# Patient Record
Sex: MALE | Race: OTHER | Employment: FULL TIME | URBAN - METROPOLITAN AREA
[De-identification: names, ages, dates, MRNs, and addresses within clinical notes are randomized per-mention and may not be internally consistent; named-entity substitution may affect disease eponyms.]

---

## 2017-04-27 ENCOUNTER — OFFICE VISIT (OUTPATIENT)
Dept: FAMILY MEDICINE CLINIC | Facility: CLINIC | Age: 37
End: 2017-04-27

## 2017-04-27 VITALS
TEMPERATURE: 98 F | BODY MASS INDEX: 32 KG/M2 | DIASTOLIC BLOOD PRESSURE: 80 MMHG | HEART RATE: 74 BPM | SYSTOLIC BLOOD PRESSURE: 131 MMHG | RESPIRATION RATE: 16 BRPM | WEIGHT: 248 LBS

## 2017-04-27 DIAGNOSIS — R35.0 URINARY FREQUENCY: Primary | ICD-10-CM

## 2017-04-27 PROCEDURE — 99213 OFFICE O/P EST LOW 20 MIN: CPT | Performed by: FAMILY MEDICINE

## 2017-04-27 PROCEDURE — 99212 OFFICE O/P EST SF 10 MIN: CPT | Performed by: FAMILY MEDICINE

## 2017-04-27 PROCEDURE — 81003 URINALYSIS AUTO W/O SCOPE: CPT | Performed by: FAMILY MEDICINE

## 2017-04-27 NOTE — PROGRESS NOTES
HPI:  Teri Case is a 39year old male who presents for abdominal pain since Monday. Started when he was driving home. Pain resolved and then came back much worse last night. He states it also felt like he needed to urinate.   Had trouble sleeping secondary to hepatosplenomegaly      Assessment:/Plan:  Urinary frequency  (primary encounter diagnosis)    UA normal. Will send urine culture. Symptoms have improved some.   Monitor pain and call if worsens    Janeal Pleva, DO

## 2017-05-04 ENCOUNTER — TELEPHONE (OUTPATIENT)
Dept: FAMILY MEDICINE CLINIC | Facility: CLINIC | Age: 37
End: 2017-05-04

## 2017-05-04 NOTE — TELEPHONE ENCOUNTER
----- Message from Ofelia Ham MD sent at 5/3/2017  1:49 PM CDT -----  If symptoms have not resolved by end of week, make follow-up appt and we can decide what to do next.   Thanks- Dr. Vladimir Nelson  ----- Message -----     From: Christal Murray     Sent:

## 2017-05-31 ENCOUNTER — OFFICE VISIT (OUTPATIENT)
Dept: FAMILY MEDICINE CLINIC | Facility: CLINIC | Age: 37
End: 2017-05-31

## 2017-05-31 VITALS
TEMPERATURE: 98 F | BODY MASS INDEX: 31 KG/M2 | WEIGHT: 244 LBS | RESPIRATION RATE: 16 BRPM | DIASTOLIC BLOOD PRESSURE: 79 MMHG | SYSTOLIC BLOOD PRESSURE: 120 MMHG | HEART RATE: 64 BPM

## 2017-05-31 DIAGNOSIS — R30.0 DYSURIA: Primary | ICD-10-CM

## 2017-05-31 PROCEDURE — 99213 OFFICE O/P EST LOW 20 MIN: CPT | Performed by: FAMILY MEDICINE

## 2017-05-31 PROCEDURE — 81003 URINALYSIS AUTO W/O SCOPE: CPT | Performed by: FAMILY MEDICINE

## 2017-05-31 NOTE — PROGRESS NOTES
HPI: Janette Cruz is a 39year old male who presents for UTI symptoms. Last seen on 4/27 for similar symptoms. Went away few days after and then started few days ago. Pt reports that he is still having LLQ pain, urinary frequency, and dysuria.  Denies penile dis DO

## 2017-06-21 ENCOUNTER — OFFICE VISIT (OUTPATIENT)
Dept: SURGERY | Facility: CLINIC | Age: 37
End: 2017-06-21

## 2017-06-21 VITALS
DIASTOLIC BLOOD PRESSURE: 80 MMHG | HEART RATE: 65 BPM | SYSTOLIC BLOOD PRESSURE: 118 MMHG | HEIGHT: 74 IN | TEMPERATURE: 98 F | WEIGHT: 244 LBS | BODY MASS INDEX: 31.32 KG/M2

## 2017-06-21 DIAGNOSIS — R10.9 LEFT LATERAL ABDOMINAL PAIN: Primary | ICD-10-CM

## 2017-06-21 PROCEDURE — 99244 OFF/OP CNSLTJ NEW/EST MOD 40: CPT | Performed by: UROLOGY

## 2017-06-21 PROCEDURE — 81002 URINALYSIS NONAUTO W/O SCOPE: CPT | Performed by: UROLOGY

## 2017-06-21 PROCEDURE — 99213 OFFICE O/P EST LOW 20 MIN: CPT | Performed by: UROLOGY

## 2017-06-21 NOTE — PROGRESS NOTES
SUBJECTIVE:  Nasrin Dooley is a 39year old male who presents for a consultation at the request of, and a copy of this note will be sent to, Dr. Chino Horan, for evaluation of  Left lower quadrant abdominal pain and urgency, dysuria.   This first started 2 drinks or equivalent per week       Comment: Socially           REVIEW OF SYSTEMS:  RESPIRATORY:  Negative for chest tightness, wheezing, cough, shortness of breath,  sputum production,chest pain or pleurisy.   CARDIOVASCULAR:  Negative for pain or chest di encounter    Imaging & Referrals:  None

## 2017-06-27 ENCOUNTER — HOSPITAL ENCOUNTER (OUTPATIENT)
Dept: CT IMAGING | Facility: HOSPITAL | Age: 37
Discharge: HOME | End: 2017-06-27
Attending: UROLOGY
Payer: COMMERCIAL

## 2017-06-27 DIAGNOSIS — R10.9 LEFT LATERAL ABDOMINAL PAIN: ICD-10-CM

## 2017-06-27 LAB — CREAT BLD-MCNC: 1 MG/DL (ref 0.5–1.5)

## 2017-06-27 PROCEDURE — 82565 ASSAY OF CREATININE: CPT

## 2017-06-27 PROCEDURE — 74178 CT ABD&PLV WO CNTR FLWD CNTR: CPT | Performed by: UROLOGY

## 2017-07-14 ENCOUNTER — TELEPHONE (OUTPATIENT)
Dept: SURGERY | Facility: CLINIC | Age: 37
End: 2017-07-14

## 2017-07-14 NOTE — TELEPHONE ENCOUNTER
----- Message from Taya Lew MD sent at 7/10/2017  9:56 PM CDT -----  Call pt. Inform him CT scan shows only a 2 mm right nonobstructing kidney stone, gallstones that he should discuss with his PCP. No other findings are noted.   He should followup p

## 2017-07-21 ENCOUNTER — TELEPHONE (OUTPATIENT)
Dept: FAMILY MEDICINE CLINIC | Facility: CLINIC | Age: 37
End: 2017-07-21

## 2017-07-21 DIAGNOSIS — K80.20 CALCULUS OF GALLBLADDER WITHOUT CHOLECYSTITIS WITHOUT OBSTRUCTION: Primary | ICD-10-CM

## 2017-07-21 NOTE — TELEPHONE ENCOUNTER
Reviewed doctor's recommendations with pt, pt agreed with advice given. Per pt request referral information left on his VM.

## 2017-07-21 NOTE — TELEPHONE ENCOUNTER
Pt states he had CT scan done as ordered by urologist. Pt states he was advised to contact his PCP in follow up for gallstones and kidney stones as seen on CT.  Pt asking for further recommendations, states in the past he has also seen Dr. Ana Barba for symp

## 2017-08-23 ENCOUNTER — APPOINTMENT (OUTPATIENT)
Dept: LAB | Facility: HOSPITAL | Age: 37
End: 2017-08-23
Attending: PHYSICIAN ASSISTANT
Payer: COMMERCIAL

## 2017-08-23 ENCOUNTER — OFFICE VISIT (OUTPATIENT)
Dept: GASTROENTEROLOGY | Facility: CLINIC | Age: 37
End: 2017-08-23

## 2017-08-23 VITALS
DIASTOLIC BLOOD PRESSURE: 82 MMHG | TEMPERATURE: 98 F | WEIGHT: 240.81 LBS | HEART RATE: 88 BPM | HEIGHT: 74 IN | BODY MASS INDEX: 30.9 KG/M2 | OXYGEN SATURATION: 96 % | SYSTOLIC BLOOD PRESSURE: 120 MMHG

## 2017-08-23 DIAGNOSIS — R12 HEARTBURN: ICD-10-CM

## 2017-08-23 DIAGNOSIS — R10.32 LEFT LOWER QUADRANT PAIN: ICD-10-CM

## 2017-08-23 DIAGNOSIS — R93.5 ABNORMAL CT OF THE ABDOMEN: Primary | ICD-10-CM

## 2017-08-23 LAB
ALBUMIN SERPL BCP-MCNC: 4.4 G/DL (ref 3.5–4.8)
ALBUMIN/GLOB SERPL: 1.5 {RATIO} (ref 1–2)
ALP SERPL-CCNC: 68 U/L (ref 32–100)
ALT SERPL-CCNC: 47 U/L (ref 17–63)
ANION GAP SERPL CALC-SCNC: 9 MMOL/L (ref 0–18)
AST SERPL-CCNC: 30 U/L (ref 15–41)
BILIRUB SERPL-MCNC: 1.1 MG/DL (ref 0.3–1.2)
BUN SERPL-MCNC: 14 MG/DL (ref 8–20)
BUN/CREAT SERPL: 13.9 (ref 10–20)
CALCIUM SERPL-MCNC: 9.6 MG/DL (ref 8.5–10.5)
CHLORIDE SERPL-SCNC: 106 MMOL/L (ref 95–110)
CO2 SERPL-SCNC: 22 MMOL/L (ref 22–32)
CREAT SERPL-MCNC: 1.01 MG/DL (ref 0.5–1.5)
GLOBULIN PLAS-MCNC: 3 G/DL (ref 2.5–3.7)
GLUCOSE SERPL-MCNC: 91 MG/DL (ref 70–99)
LIPASE SERPL-CCNC: 25 U/L (ref 22–51)
OSMOLALITY UR CALC.SUM OF ELEC: 284 MOSM/KG (ref 275–295)
POTASSIUM SERPL-SCNC: 4 MMOL/L (ref 3.3–5.1)
PROT SERPL-MCNC: 7.4 G/DL (ref 5.9–8.4)
SODIUM SERPL-SCNC: 137 MMOL/L (ref 136–144)

## 2017-08-23 PROCEDURE — 36415 COLL VENOUS BLD VENIPUNCTURE: CPT

## 2017-08-23 PROCEDURE — 80053 COMPREHEN METABOLIC PANEL: CPT

## 2017-08-23 PROCEDURE — 83690 ASSAY OF LIPASE: CPT

## 2017-08-23 PROCEDURE — 99243 OFF/OP CNSLTJ NEW/EST LOW 30: CPT | Performed by: PHYSICIAN ASSISTANT

## 2017-08-23 PROCEDURE — 99212 OFFICE O/P EST SF 10 MIN: CPT | Performed by: PHYSICIAN ASSISTANT

## 2017-08-23 NOTE — H&P
[address] [facility] [address] Gastroenterology                                                                                                  Clinic History and Physical     Pa gastric cancers  - Negative for liver disease/cancer  - Negative for known IBD    Social Hx:  - Former Smoker - Quit 10 years ago  - Social ETOH - a few beers each weekend   - No NSAIDs/no ASA  - Occupation: Instructor (Automotive)   - Lives with wife at h hematuria +resolved dysuria   INTEGUMENT/BREAST:  SKIN:  negative for jaundice   ALLERGIC/IMMUNOLOGIC:  negative for hay fever  ENDOCRINE:  negative for cold intolerance and heat intolerance  MUSCULOSKELETAL:  negative for joint effusion/severe erythema  B referral for elective cholecystectomy with Dr. Brooks Rodriguez or Dr. Frieda Read. Will check for transaminitis to rule out liver disease. LLQ pain unexplained on CT - possibly referred from ? GB.     Heartburn likely secondary to classic triggers - pizza, eating late, e

## 2017-08-23 NOTE — PROGRESS NOTES
[address] [facility] [address] Gastroenterology                                                                                                  Clinic History and Physical     Pa gastric cancers  - Negative for liver disease/cancer  - Negative for known IBD    Social Hx:  - Former Smoker - Quit 10 years ago  - Social ETOH - a few beers each weekend   - No NSAIDs/no ASA  - Occupation: Instructor (Automotive)   - Lives with wife at h hematuria +resolved dysuria   INTEGUMENT/BREAST:  SKIN:  negative for jaundice   ALLERGIC/IMMUNOLOGIC:  negative for hay fever  ENDOCRINE:  negative for cold intolerance and heat intolerance  MUSCULOSKELETAL:  negative for joint effusion/severe erythema  B referral for elective cholecystectomy with Dr. Johana Denton or Dr. Johnson Bright. Will check for transaminitis to rule out liver disease. LLQ pain unexplained on CT - possibly referred from ? GB.     Heartburn likely secondary to classic triggers - pizza, eating late, e

## 2017-08-23 NOTE — PATIENT INSTRUCTIONS
Avoid Heartburn Triggers  - Laying down after meals (sit upright for at least 2-3 hours after eating meals)  - Large meals (serving sizes proportional to palm of hand)  - Spicy/greasy/acidic foods (fast food, orange/lime/lemon/red sauces)  - Excessive caff

## 2017-08-24 ENCOUNTER — TELEPHONE (OUTPATIENT)
Dept: GASTROENTEROLOGY | Facility: CLINIC | Age: 37
End: 2017-08-24

## 2017-08-24 DIAGNOSIS — K80.50 BILIARY COLIC: Primary | ICD-10-CM

## 2017-08-24 DIAGNOSIS — K80.20 CALCULUS OF GALLBLADDER WITHOUT CHOLECYSTITIS WITHOUT OBSTRUCTION: ICD-10-CM

## 2017-08-24 NOTE — TELEPHONE ENCOUNTER
Contacted patient offered appointment on 08/29/2017 with Dr. Johana Denton as patient is established with him, patient declined and states he requests to wait until he follows up with PB in 4-6 weeks. He states he will call back to schedule appointment.

## 2017-08-24 NOTE — TELEPHONE ENCOUNTER
Noted, thank you. Routed to Gen Surg RN's- please assist pt in scheduling consultation with provider per PB's message below, thank you.

## 2017-08-24 NOTE — TELEPHONE ENCOUNTER
I discussed with Dr. Rosa Richter re: this patient and the patient was well aware we would likely generate a referral to see either of our surgical MDs Jairo Ontiveros or Juliette) for possible consideration of cholecystectomy. Consultation referral ordered and signed.

## 2017-08-25 NOTE — TELEPHONE ENCOUNTER
Called patient back at number provided. LVM. No response. Please have patient provide a number and time I can call him Monday. Thank you. Note: I did advise the patient he is OK to schedule a surgical consult.

## 2017-08-25 NOTE — TELEPHONE ENCOUNTER
Pt contacted and I reviewed that PB advises for him to call and schedule the surgical consult, he verbalized understanding. He states that he prefers to be contacted M-F after 4pm if PB would like to discuss further recommendations.

## 2017-08-31 NOTE — TELEPHONE ENCOUNTER
I was able to finally reach the patient yesterday regarding surgical referral. All questions answered. Has follow up with me and also consult with Dr. Surinder Liu. No further questions - patient feels otherwise well.  Intermittent pain that is c/w history discus

## 2017-09-18 ENCOUNTER — OFFICE VISIT (OUTPATIENT)
Dept: SURGERY | Facility: CLINIC | Age: 37
End: 2017-09-18

## 2017-09-18 VITALS — WEIGHT: 244 LBS | HEIGHT: 74 IN | BODY MASS INDEX: 31.32 KG/M2

## 2017-09-18 DIAGNOSIS — R10.32 LLQ ABDOMINAL PAIN: Primary | ICD-10-CM

## 2017-09-18 DIAGNOSIS — K80.20 GALLSTONES: ICD-10-CM

## 2017-09-18 PROCEDURE — 99212 OFFICE O/P EST SF 10 MIN: CPT | Performed by: SURGERY

## 2017-09-18 PROCEDURE — 99214 OFFICE O/P EST MOD 30 MIN: CPT | Performed by: SURGERY

## 2017-09-18 NOTE — H&P
History and Physical      Sumanth Band is a 39year old male. HPI   Patient presents with:  Gallbladder: Pt. referred by Dr. Maninder Mascorro for gallstones.   Pt. with history of left lower abdominal pain for one year and pt states pain has moved higher up in 0.0 oz/week       Comment: Socially    Drug use: No            Other Topics            Concern  Caffeine Concern        Yes    Comment:Coffee, 3 cups daily        FAMILY HISTORY  Family History   Problem Relation Age of Onset   • Arthritis Father    • fi persist and no other etiology identified.          9/18/2017  Merline Rudd, MD

## 2017-09-27 NOTE — MR AVS SNAPSHOT
[facility]  [address], [address]  [phone]               Thank you for choosing us for your health care visit with Florencio Gupta MD.  We are glad to serve you and happy to provide you with this summary * Notice: This list has 2 medication(s) that are the same as other medications prescribed for you. Read the directions carefully, and ask your doctor or other care provider to review them with you.             Results of Recent Testing     URINALYSIS, AUT Fully enjoy your food when eating. Don’t eat while distracted and slow down. Avoid over sized portions. Don’t eat while when you’re bored.      EAT THESE FOODS MORE OFTEN: EAT THESE FOODS LESS OFTEN:   Make half your plate fruits and vegetables Highly - - -

## 2017-10-03 NOTE — PROGRESS NOTES
[address] [facility] Follow-up Visit    Priscilla OV:  - Dull ache in the LLQ - greasy/fatty foods makes it worse. Has been avoiding these foods \"to the point where I feel good\". Reason for his visit are what are his options. LLQ stays there. No fevers/chills.  No change in bowel habits, no hematochezia • fibromylagia [OTHER] Father    • Cancer Mother      Cancer - lung   • Diabetes Paternal Aunt    • Cancer Paternal Aunt      Cancer - lymphoma   • Diabetes Paternal Grandmother       Social History: Smoking status: Former Smoker noted, no masses are palpated -  NO LLQ pain presently  Back: No CVA tenderness   Skin: dry, warm, no jaundice  Ext: no LE edema is evident.    Neuro: Alert and interactive; patient is having movements of all 4 extremities     Nursing notes and vitals revie loss; patient states he is having difficulty with losing weight by himself but would like to try another time before going to see weight loss specialist     Recommend:  - Avoid heartburn triggers  - Avoid greasy/fatty foods  - Colonoscopy with MAC sedation

## 2017-10-04 ENCOUNTER — APPOINTMENT (OUTPATIENT)
Dept: LAB | Facility: HOSPITAL | Age: 37
End: 2017-10-04
Attending: PHYSICIAN ASSISTANT
Payer: COMMERCIAL

## 2017-10-04 ENCOUNTER — OFFICE VISIT (OUTPATIENT)
Dept: GASTROENTEROLOGY | Facility: CLINIC | Age: 37
End: 2017-10-04

## 2017-10-04 ENCOUNTER — TELEPHONE (OUTPATIENT)
Dept: GASTROENTEROLOGY | Facility: CLINIC | Age: 37
End: 2017-10-04

## 2017-10-04 VITALS
HEIGHT: 74 IN | HEART RATE: 82 BPM | SYSTOLIC BLOOD PRESSURE: 113 MMHG | WEIGHT: 243.38 LBS | BODY MASS INDEX: 31.24 KG/M2 | DIASTOLIC BLOOD PRESSURE: 80 MMHG

## 2017-10-04 DIAGNOSIS — R19.5 LOOSE STOOLS: Primary | ICD-10-CM

## 2017-10-04 DIAGNOSIS — R19.5 LOOSE STOOLS: ICD-10-CM

## 2017-10-04 DIAGNOSIS — R10.32 LLQ PAIN: Primary | ICD-10-CM

## 2017-10-04 DIAGNOSIS — R19.4 CHANGE IN BOWEL HABITS: ICD-10-CM

## 2017-10-04 PROCEDURE — 99214 OFFICE O/P EST MOD 30 MIN: CPT | Performed by: PHYSICIAN ASSISTANT

## 2017-10-04 PROCEDURE — 83516 IMMUNOASSAY NONANTIBODY: CPT

## 2017-10-04 PROCEDURE — 86255 FLUORESCENT ANTIBODY SCREEN: CPT

## 2017-10-04 PROCEDURE — 36415 COLL VENOUS BLD VENIPUNCTURE: CPT

## 2017-10-04 PROCEDURE — 82784 ASSAY IGA/IGD/IGG/IGM EACH: CPT

## 2017-10-04 PROCEDURE — 99212 OFFICE O/P EST SF 10 MIN: CPT | Performed by: PHYSICIAN ASSISTANT

## 2017-10-04 NOTE — TELEPHONE ENCOUNTER
Scheduled for:  Colon  Provider Name: Justine Lauren  Date:  11-29-17  Location:  [facility]  Sedation:  MAC  Time:  11:15am, arrival 10:15am  Prep: Suprep  Meds/Allergies Reconciled?:  yes  Diagnosis with codes:  Loose stools R19.5, change in bowel habits R19.4  Was patien

## 2017-10-04 NOTE — PATIENT INSTRUCTIONS
1. Schedule colonoscopy with Dr. Hortencia Pham with MAC sedation. Schedule for about a month out. 2.  bowel prep from pharmacy - I have prescribed Suprep. This is a smaller volume preparation.  If this is too expensive, however, please call my office and

## 2017-10-05 ENCOUNTER — TELEPHONE (OUTPATIENT)
Dept: GASTROENTEROLOGY | Facility: CLINIC | Age: 37
End: 2017-10-05

## 2017-10-05 DIAGNOSIS — R19.5 LOOSE STOOLS: Primary | ICD-10-CM

## 2017-10-11 ENCOUNTER — TELEPHONE (OUTPATIENT)
Dept: GASTROENTEROLOGY | Facility: CLINIC | Age: 37
End: 2017-10-11

## 2017-10-11 NOTE — TELEPHONE ENCOUNTER
Negative celiac testing. Patient states when he avoids his triggers, he's doing a lot better. Junk food gives him loose stools/abdominal discomfort. Will call if anything changes. No further questions.

## 2017-10-18 NOTE — TELEPHONE ENCOUNTER
Noted, thank you Juanis Lund. Pt presents to ED c/o lower back pain and left leg pain for 1 month. Pt denies bladder/bowel dysfunction.

## 2017-10-31 ENCOUNTER — TELEPHONE (OUTPATIENT)
Dept: FAMILY MEDICINE CLINIC | Facility: CLINIC | Age: 37
End: 2017-10-31

## 2017-10-31 NOTE — TELEPHONE ENCOUNTER
Spouse contacted, and advised that pt had his Tdap in 2014, and doesn't need another one until 2024. Nurse visit scheduled for a flu shot for pt and his son on 11/7/17.

## 2017-11-07 ENCOUNTER — TELEPHONE (OUTPATIENT)
Dept: GASTROENTEROLOGY | Facility: CLINIC | Age: 37
End: 2017-11-07

## 2017-11-07 ENCOUNTER — IMMUNIZATION (OUTPATIENT)
Dept: FAMILY MEDICINE CLINIC | Facility: CLINIC | Age: 37
End: 2017-11-07

## 2017-11-07 DIAGNOSIS — Z23 NEED FOR VACCINATION: ICD-10-CM

## 2017-11-07 PROCEDURE — 90471 IMMUNIZATION ADMIN: CPT | Performed by: FAMILY MEDICINE

## 2017-11-07 PROCEDURE — 90686 IIV4 VACC NO PRSV 0.5 ML IM: CPT | Performed by: FAMILY MEDICINE

## 2017-11-21 ENCOUNTER — TELEPHONE (OUTPATIENT)
Dept: GASTROENTEROLOGY | Facility: CLINIC | Age: 37
End: 2017-11-21

## 2017-11-21 NOTE — TELEPHONE ENCOUNTER
Pt requesting to speak with Dr or RN indicates he has new symptoms nausea after eating. Pls call pt DX:[phone],HALIMA.   *Pt has upcoming cln ruth 11/29

## 2017-11-21 NOTE — TELEPHONE ENCOUNTER
LMTCB- reviewed that if having severe abdominal pain, uncontrollable n/v, fever, chills, he should present to the ED for further evaluation.    -Awaiting pt call back.

## 2017-11-29 ENCOUNTER — ANESTHESIA EVENT (OUTPATIENT)
Dept: ENDOSCOPY | Facility: HOSPITAL | Age: 37
End: 2017-11-29
Payer: COMMERCIAL

## 2017-11-29 ENCOUNTER — TELEPHONE (OUTPATIENT)
Dept: GASTROENTEROLOGY | Facility: CLINIC | Age: 37
End: 2017-11-29

## 2017-11-29 ENCOUNTER — HOSPITAL ENCOUNTER (OUTPATIENT)
Facility: HOSPITAL | Age: 37
Setting detail: HOSPITAL OUTPATIENT SURGERY
Discharge: HOME | End: 2017-11-29
Attending: INTERNAL MEDICINE | Admitting: INTERNAL MEDICINE
Payer: COMMERCIAL

## 2017-11-29 ENCOUNTER — SURGERY (OUTPATIENT)
Age: 37
End: 2017-11-29

## 2017-11-29 ENCOUNTER — ANESTHESIA (OUTPATIENT)
Dept: ENDOSCOPY | Facility: HOSPITAL | Age: 37
End: 2017-11-29
Payer: COMMERCIAL

## 2017-11-29 DIAGNOSIS — R19.4 CHANGE IN BOWEL HABITS: ICD-10-CM

## 2017-11-29 DIAGNOSIS — K64.8 INTERNAL HEMORRHOIDS: ICD-10-CM

## 2017-11-29 DIAGNOSIS — K38.9 APPENDIX DISEASE: Primary | ICD-10-CM

## 2017-11-29 DIAGNOSIS — K63.5 POLYP OF ASCENDING COLON, UNSPECIFIED TYPE: Primary | ICD-10-CM

## 2017-11-29 DIAGNOSIS — R19.5 LOOSE STOOLS: ICD-10-CM

## 2017-11-29 PROCEDURE — 45380 COLONOSCOPY AND BIOPSY: CPT | Performed by: INTERNAL MEDICINE

## 2017-11-29 PROCEDURE — 0DBE8ZX EXCISION OF LARGE INTESTINE, VIA NATURAL OR ARTIFICIAL OPENING ENDOSCOPIC, DIAGNOSTIC: ICD-10-PCS | Performed by: INTERNAL MEDICINE

## 2017-11-29 PROCEDURE — 45385 COLONOSCOPY W/LESION REMOVAL: CPT | Performed by: INTERNAL MEDICINE

## 2017-11-29 PROCEDURE — 0DBJ8ZX EXCISION OF APPENDIX, VIA NATURAL OR ARTIFICIAL OPENING ENDOSCOPIC, DIAGNOSTIC: ICD-10-PCS | Performed by: INTERNAL MEDICINE

## 2017-11-29 PROCEDURE — 0DBK8ZX EXCISION OF ASCENDING COLON, VIA NATURAL OR ARTIFICIAL OPENING ENDOSCOPIC, DIAGNOSTIC: ICD-10-PCS | Performed by: INTERNAL MEDICINE

## 2017-11-29 RX ORDER — SODIUM CHLORIDE, SODIUM LACTATE, POTASSIUM CHLORIDE, CALCIUM CHLORIDE 600; 310; 30; 20 MG/100ML; MG/100ML; MG/100ML; MG/100ML
INJECTION, SOLUTION INTRAVENOUS CONTINUOUS
Status: DISCONTINUED | OUTPATIENT
Start: 2017-11-29 | End: 2017-11-29

## 2017-11-29 RX ORDER — PANTOPRAZOLE SODIUM 40 MG/1
40 TABLET, DELAYED RELEASE ORAL
Qty: 30 TABLET | Refills: 3 | Status: SHIPPED | OUTPATIENT
Start: 2017-11-29 | End: 2017-12-29

## 2017-11-29 RX ORDER — NALOXONE HYDROCHLORIDE 0.4 MG/ML
80 INJECTION, SOLUTION INTRAMUSCULAR; INTRAVENOUS; SUBCUTANEOUS AS NEEDED
Status: DISCONTINUED | OUTPATIENT
Start: 2017-11-29 | End: 2017-11-29

## 2017-11-29 RX ORDER — HYDROMORPHONE HYDROCHLORIDE 1 MG/ML
0.4 INJECTION, SOLUTION INTRAMUSCULAR; INTRAVENOUS; SUBCUTANEOUS EVERY 5 MIN PRN
Status: DISCONTINUED | OUTPATIENT
Start: 2017-11-29 | End: 2017-11-29

## 2017-11-29 RX ORDER — HYDROCODONE BITARTRATE AND ACETAMINOPHEN 5; 325 MG/1; MG/1
1 TABLET ORAL AS NEEDED
Status: DISCONTINUED | OUTPATIENT
Start: 2017-11-29 | End: 2017-11-29

## 2017-11-29 RX ORDER — HALOPERIDOL 5 MG/ML
0.25 INJECTION INTRAMUSCULAR ONCE AS NEEDED
Status: DISCONTINUED | OUTPATIENT
Start: 2017-11-29 | End: 2017-11-29

## 2017-11-29 RX ORDER — MORPHINE SULFATE 10 MG/ML
6 INJECTION, SOLUTION INTRAMUSCULAR; INTRAVENOUS EVERY 10 MIN PRN
Status: DISCONTINUED | OUTPATIENT
Start: 2017-11-29 | End: 2017-11-29

## 2017-11-29 RX ORDER — LIDOCAINE HYDROCHLORIDE 10 MG/ML
INJECTION, SOLUTION EPIDURAL; INFILTRATION; INTRACAUDAL; PERINEURAL AS NEEDED
Status: DISCONTINUED | OUTPATIENT
Start: 2017-11-29 | End: 2017-11-29 | Stop reason: SURG

## 2017-11-29 RX ORDER — HYDROMORPHONE HYDROCHLORIDE 1 MG/ML
0.2 INJECTION, SOLUTION INTRAMUSCULAR; INTRAVENOUS; SUBCUTANEOUS EVERY 5 MIN PRN
Status: DISCONTINUED | OUTPATIENT
Start: 2017-11-29 | End: 2017-11-29

## 2017-11-29 RX ORDER — MORPHINE SULFATE 4 MG/ML
4 INJECTION, SOLUTION INTRAMUSCULAR; INTRAVENOUS EVERY 10 MIN PRN
Status: DISCONTINUED | OUTPATIENT
Start: 2017-11-29 | End: 2017-11-29

## 2017-11-29 RX ORDER — ONDANSETRON 2 MG/ML
4 INJECTION INTRAMUSCULAR; INTRAVENOUS ONCE AS NEEDED
Status: DISCONTINUED | OUTPATIENT
Start: 2017-11-29 | End: 2017-11-29

## 2017-11-29 RX ORDER — HYDROCODONE BITARTRATE AND ACETAMINOPHEN 5; 325 MG/1; MG/1
2 TABLET ORAL AS NEEDED
Status: DISCONTINUED | OUTPATIENT
Start: 2017-11-29 | End: 2017-11-29

## 2017-11-29 RX ORDER — MORPHINE SULFATE 4 MG/ML
2 INJECTION, SOLUTION INTRAMUSCULAR; INTRAVENOUS EVERY 10 MIN PRN
Status: DISCONTINUED | OUTPATIENT
Start: 2017-11-29 | End: 2017-11-29

## 2017-11-29 RX ORDER — HYDROMORPHONE HYDROCHLORIDE 1 MG/ML
0.6 INJECTION, SOLUTION INTRAMUSCULAR; INTRAVENOUS; SUBCUTANEOUS EVERY 5 MIN PRN
Status: DISCONTINUED | OUTPATIENT
Start: 2017-11-29 | End: 2017-11-29

## 2017-11-29 RX ADMIN — LIDOCAINE HYDROCHLORIDE 50 MG: 10 INJECTION, SOLUTION EPIDURAL; INFILTRATION; INTRACAUDAL; PERINEURAL at 11:14:00

## 2017-11-29 RX ADMIN — SODIUM CHLORIDE, SODIUM LACTATE, POTASSIUM CHLORIDE, CALCIUM CHLORIDE: 600; 310; 30; 20 INJECTION, SOLUTION INTRAVENOUS at 11:12:00

## 2017-11-29 NOTE — ANESTHESIA POSTPROCEDURE EVALUATION
Patient: Rod Livingston    Procedure Summary     Date:  11/29/17 Room / Location:  [address] [place] [address] ENDOSCOPY 01 / [address] [place] [address] ENDOSCOPY    Anesthesia Start:  0780 Anesthesia Stop:  8318    Procedure:  COLONOSCOPY (N/A ) Diagnosis:       Change in bowel habits      Loose stools

## 2017-11-29 NOTE — OPERATIVE REPORT
COLONOSCOPY REPORT    Stevan Perry     11/15/1980 Age 44year old   PCP Dionte Mendoza DO Endoscopist Lani Palafox MD     Date of procedure: 17    Procedure: Colonoscopy w/cold biopsy and cold snare polypectomy    Pre-operative diagnosis: small internal hemorrhoids. 5. At the cecal base, the appendiceal orifice is not present, and instead the appendix appears to be inverted. No mucus was noted. Biopsies were obtained of the inverted appendix.  Probing did not reveal any clear submucosal m

## 2017-11-29 NOTE — H&P
History & Physical Examination    Patient Name: Irwin Mills  MRN: [id number]  CSN: [id number]  YOB: 1980    Diagnosis: abdominal discomfort, change in bowels      Prescriptions Prior to Admission:  FIBER OR Take by mouth.  Disp:  Rfl:  Taking unspecified    • Kidney stone    • Lumbar disc herniation     L4-L5, tx by chiropractor   • Sinusitis      Past Surgical History:  1981: APPENDECTOMY      Comment: laparotomy  2001: CORRECT BUNION,SIMPLE Left  2009: [address]  2002: Flaca Petty

## 2017-11-29 NOTE — ANESTHESIA PREPROCEDURE EVALUATION
Anesthesia PreOp Note    HPI:     Kiki Mccullough is a 44year old male who presents for preoperative consultation requested by: Georgina Pantoja MD    Date of Surgery: 11/29/2017    Procedure(s):  COLONOSCOPY  Indication: Change in bowel habits   Loose stools Comment: Coffee, 3 cups daily     Social History Narrative   None on file       Available pre-op labs reviewed. Vital Signs: Body mass index is 28.25 kg/m². height is 1.88 m (6' 2\") and weight is 99.8 kg (220 lb).  His blood pressure is 125/

## 2017-11-30 VITALS
SYSTOLIC BLOOD PRESSURE: 120 MMHG | HEART RATE: 70 BPM | WEIGHT: 220 LBS | HEIGHT: 74 IN | OXYGEN SATURATION: 99 % | DIASTOLIC BLOOD PRESSURE: 93 MMHG | TEMPERATURE: 98 F | BODY MASS INDEX: 28.23 KG/M2 | RESPIRATION RATE: 16 BRPM

## 2017-12-01 ENCOUNTER — TELEPHONE (OUTPATIENT)
Dept: GASTROENTEROLOGY | Facility: CLINIC | Age: 37
End: 2017-12-01

## 2017-12-01 NOTE — TELEPHONE ENCOUNTER
D/w Lowella Dakin re: his CLn path. Tubular adenoma polyp removed. The inverted appendiceal lesion showed normal colonic mucosa on biopsies BUT UNDERLYING MALIGNANCY/TUMOR cannot be exclude. Lowella Dakin understands this.     I did d/w Dr. Sherice Levine who will see patient to as

## 2017-12-01 NOTE — TELEPHONE ENCOUNTER
FODMAP diet mailed to pt home address today. Entered into EPIC:Recall colon in 5 years per Dr. Eben Zamudio. Last Colon done 11/29/17, next due 11/29/2022. Snapshot updated.

## 2018-11-16 ENCOUNTER — IMMUNIZATION (OUTPATIENT)
Dept: FAMILY MEDICINE CLINIC | Facility: CLINIC | Age: 38
End: 2018-11-16
Payer: COMMERCIAL

## 2018-11-16 DIAGNOSIS — Z23 NEED FOR VACCINATION: ICD-10-CM

## 2018-11-16 PROCEDURE — 90686 IIV4 VACC NO PRSV 0.5 ML IM: CPT | Performed by: FAMILY MEDICINE

## 2018-11-16 PROCEDURE — 90471 IMMUNIZATION ADMIN: CPT | Performed by: FAMILY MEDICINE

## 2019-07-11 ENCOUNTER — APPOINTMENT (OUTPATIENT)
Dept: LAB | Age: 39
End: 2019-07-11
Attending: FAMILY MEDICINE
Payer: COMMERCIAL

## 2019-07-11 ENCOUNTER — OFFICE VISIT (OUTPATIENT)
Dept: FAMILY MEDICINE CLINIC | Facility: CLINIC | Age: 39
End: 2019-07-11
Payer: COMMERCIAL

## 2019-07-11 VITALS
WEIGHT: 244 LBS | DIASTOLIC BLOOD PRESSURE: 77 MMHG | HEART RATE: 63 BPM | BODY MASS INDEX: 31 KG/M2 | TEMPERATURE: 98 F | RESPIRATION RATE: 16 BRPM | SYSTOLIC BLOOD PRESSURE: 122 MMHG

## 2019-07-11 DIAGNOSIS — M79.675 GREAT TOE PAIN, LEFT: ICD-10-CM

## 2019-07-11 DIAGNOSIS — B35.1 TOENAIL FUNGUS: ICD-10-CM

## 2019-07-11 DIAGNOSIS — M79.675 GREAT TOE PAIN, LEFT: Primary | ICD-10-CM

## 2019-07-11 LAB — URATE SERPL-MCNC: 7.9 MG/DL (ref 3.5–7.2)

## 2019-07-11 PROCEDURE — 84550 ASSAY OF BLOOD/URIC ACID: CPT

## 2019-07-11 PROCEDURE — 36415 COLL VENOUS BLD VENIPUNCTURE: CPT

## 2019-07-11 PROCEDURE — 99214 OFFICE O/P EST MOD 30 MIN: CPT | Performed by: FAMILY MEDICINE

## 2019-07-11 RX ORDER — PREDNISONE 20 MG/1
20 TABLET ORAL 2 TIMES DAILY
Qty: 10 TABLET | Refills: 0 | Status: SHIPPED | OUTPATIENT
Start: 2019-07-11 | End: 2019-07-16

## 2019-07-11 NOTE — PROGRESS NOTES
HPI: Shelli Ferreira is a 43year old male who presents for left foot pain. Started having pain in left foot about 4 days ago. Had bunion surgery in the past and gets occasional pain in that area. Has 2 pins in place. Pain is located in 1st MTP.   Started wearing bet

## 2019-07-24 ENCOUNTER — PATIENT MESSAGE (OUTPATIENT)
Dept: FAMILY MEDICINE CLINIC | Facility: CLINIC | Age: 39
End: 2019-07-24

## 2019-07-24 DIAGNOSIS — M10.9 ACUTE GOUT, UNSPECIFIED CAUSE, UNSPECIFIED SITE: Primary | ICD-10-CM

## 2019-07-24 RX ORDER — PREDNISONE 20 MG/1
20 TABLET ORAL 2 TIMES DAILY
Qty: 10 TABLET | Refills: 0 | Status: SHIPPED | OUTPATIENT
Start: 2019-07-24 | End: 2019-07-29

## 2019-07-24 RX ORDER — FEBUXOSTAT 40 MG/1
40 TABLET, FILM COATED ORAL DAILY
Qty: 30 TABLET | Refills: 1 | Status: SHIPPED | OUTPATIENT
Start: 2019-07-24 | End: 2019-09-25

## 2019-07-24 NOTE — TELEPHONE ENCOUNTER
From: Lela Rodriguez  To: Keith Bobby DO  Sent: 7/24/2019 10:12 AM CDT  Subject: Test Results Question    Hi! Gout pain came back. Have not eaten any purine rich foods all week. Been eating a lot of celery and cherrys!   As much as I hate medicine, I

## 2019-07-24 NOTE — TELEPHONE ENCOUNTER
Spoke with patient who still has symptoms. Start Uloric at 40mg. Recheck uric acid in 2 weeks. If remains above 6, increase dose to 80.

## 2019-08-09 ENCOUNTER — HOSPITAL ENCOUNTER (OUTPATIENT)
Dept: GENERAL RADIOLOGY | Age: 39
Discharge: HOME | End: 2019-08-09
Attending: FAMILY MEDICINE
Payer: COMMERCIAL

## 2019-08-09 ENCOUNTER — OFFICE VISIT (OUTPATIENT)
Dept: FAMILY MEDICINE CLINIC | Facility: CLINIC | Age: 39
End: 2019-08-09
Payer: COMMERCIAL

## 2019-08-09 VITALS
HEIGHT: 74 IN | WEIGHT: 231.63 LBS | HEART RATE: 70 BPM | TEMPERATURE: 98 F | SYSTOLIC BLOOD PRESSURE: 130 MMHG | BODY MASS INDEX: 29.73 KG/M2 | DIASTOLIC BLOOD PRESSURE: 80 MMHG | OXYGEN SATURATION: 98 %

## 2019-08-09 DIAGNOSIS — L30.9 DERMATITIS: Primary | ICD-10-CM

## 2019-08-09 DIAGNOSIS — M79.675 GREAT TOE PAIN, LEFT: ICD-10-CM

## 2019-08-09 PROCEDURE — 73630 X-RAY EXAM OF FOOT: CPT | Performed by: FAMILY MEDICINE

## 2019-08-09 PROCEDURE — 99213 OFFICE O/P EST LOW 20 MIN: CPT | Performed by: FAMILY MEDICINE

## 2019-08-09 RX ORDER — TRIAMCINOLONE ACETONIDE 0.25 MG/G
1 CREAM TOPICAL 2 TIMES DAILY
Qty: 80 G | Refills: 0 | Status: SHIPPED | OUTPATIENT
Start: 2019-08-09 | End: 2019-09-25

## 2019-08-09 NOTE — PROGRESS NOTES
HPI:    Michael Enrique is a 43year old male presents to clinic with a one-week history of an itchy rash on both arms. Does not have a history of eczema or other skin issues. Feels itching is getting worse.   Does note being outdoors last week in [place] Allergies:    Amoxicillin             PAIN  Azithromycin            NAUSEA ONLY  Cat Dander [Dander]       Pollinex-T [Modifie*      Shellfish-Derived P*    SHORTNESS OF BREATH      ROS:   Review of Systems  See HPI   PHYSICAL EXAM:      08/09/19  10

## 2019-09-25 ENCOUNTER — OFFICE VISIT (OUTPATIENT)
Dept: FAMILY MEDICINE CLINIC | Facility: CLINIC | Age: 39
End: 2019-09-25
Payer: COMMERCIAL

## 2019-09-25 ENCOUNTER — APPOINTMENT (OUTPATIENT)
Dept: LAB | Age: 39
End: 2019-09-25
Attending: FAMILY MEDICINE
Payer: COMMERCIAL

## 2019-09-25 VITALS
WEIGHT: 231 LBS | HEART RATE: 73 BPM | TEMPERATURE: 98 F | DIASTOLIC BLOOD PRESSURE: 71 MMHG | BODY MASS INDEX: 29.65 KG/M2 | RESPIRATION RATE: 16 BRPM | SYSTOLIC BLOOD PRESSURE: 116 MMHG | HEIGHT: 73.82 IN

## 2019-09-25 DIAGNOSIS — Z00.00 ROUTINE PHYSICAL EXAMINATION: Primary | ICD-10-CM

## 2019-09-25 DIAGNOSIS — M10.9 ACUTE GOUT, UNSPECIFIED CAUSE, UNSPECIFIED SITE: ICD-10-CM

## 2019-09-25 DIAGNOSIS — M1A.9XX0 CHRONIC GOUT WITHOUT TOPHUS, UNSPECIFIED CAUSE, UNSPECIFIED SITE: ICD-10-CM

## 2019-09-25 LAB
ALBUMIN SERPL-MCNC: 4.2 G/DL (ref 3.4–5)
ALBUMIN/GLOB SERPL: 1.2 {RATIO} (ref 1–2)
ALP LIVER SERPL-CCNC: 71 U/L (ref 45–117)
ALT SERPL-CCNC: 77 U/L (ref 16–61)
ANION GAP SERPL CALC-SCNC: 6 MMOL/L (ref 0–18)
AST SERPL-CCNC: 35 U/L (ref 15–37)
BILIRUB SERPL-MCNC: 1.4 MG/DL (ref 0.1–2)
BUN BLD-MCNC: 22 MG/DL (ref 7–18)
BUN/CREAT SERPL: 22.9 (ref 10–20)
CALCIUM BLD-MCNC: 9 MG/DL (ref 8.5–10.1)
CHLORIDE SERPL-SCNC: 107 MMOL/L (ref 98–112)
CO2 SERPL-SCNC: 24 MMOL/L (ref 21–32)
CREAT BLD-MCNC: 0.96 MG/DL (ref 0.7–1.3)
GLOBULIN PLAS-MCNC: 3.5 G/DL (ref 2.8–4.4)
GLUCOSE BLD-MCNC: 87 MG/DL (ref 70–99)
M PROTEIN MFR SERPL ELPH: 7.7 G/DL (ref 6.4–8.2)
OSMOLALITY SERPL CALC.SUM OF ELEC: 287 MOSM/KG (ref 275–295)
PATIENT FASTING: NO
POTASSIUM SERPL-SCNC: 3.8 MMOL/L (ref 3.5–5.1)
SODIUM SERPL-SCNC: 137 MMOL/L (ref 136–145)
URATE SERPL-MCNC: 7.4 MG/DL (ref 3.5–7.2)

## 2019-09-25 PROCEDURE — 36415 COLL VENOUS BLD VENIPUNCTURE: CPT

## 2019-09-25 PROCEDURE — 84550 ASSAY OF BLOOD/URIC ACID: CPT

## 2019-09-25 PROCEDURE — 80053 COMPREHEN METABOLIC PANEL: CPT

## 2019-09-25 PROCEDURE — 99395 PREV VISIT EST AGE 18-39: CPT | Performed by: FAMILY MEDICINE

## 2019-09-25 NOTE — PROGRESS NOTES
HPI: Jr Shannon is a 43year old male who presents for routine physical. Son has been sick. Pt states he has runny nose and mild cough. Feeling well. Getting limited exercise. Hard with 2 kids. Lost 13 pounds after he stopped meat and beer. TdaP up to date. gout without tophus, unspecified cause, unspecified site    Pain improved with dietary changes. Will recheck uric acid levels.      Padma Fox DO

## 2019-09-30 ENCOUNTER — IMMUNIZATION (OUTPATIENT)
Dept: FAMILY MEDICINE CLINIC | Facility: CLINIC | Age: 39
End: 2019-09-30
Payer: COMMERCIAL

## 2019-09-30 DIAGNOSIS — Z23 NEED FOR VACCINATION: ICD-10-CM

## 2019-09-30 PROCEDURE — 90471 IMMUNIZATION ADMIN: CPT | Performed by: INTERNAL MEDICINE

## 2019-09-30 PROCEDURE — 90686 IIV4 VACC NO PRSV 0.5 ML IM: CPT | Performed by: INTERNAL MEDICINE

## 2020-02-05 ENCOUNTER — NURSE TRIAGE (OUTPATIENT)
Dept: FAMILY MEDICINE CLINIC | Facility: CLINIC | Age: 40
End: 2020-02-05

## 2020-02-05 NOTE — TELEPHONE ENCOUNTER
Patient has scheduled appointment for 2/20 please review comments.      Appointment For: Anca Cutler ([id number])   Visit Type: Sharri Adame ([id number])      2/20/2020    3:00 PM  15 mins. Josh Trejo DO     [facility]      Patient Comments:   Gino Bernal

## 2020-02-05 NOTE — TELEPHONE ENCOUNTER
Action Requested: Summary for Provider     []  Critical Lab, Recommendations Needed  [] Need Additional Advice  []   FYI    []   Need Orders  [] Need Medications Sent to Pharmacy  []  Other     SUMMARY:  Per protocol advised office visit.   Patient will iram

## 2020-02-20 ENCOUNTER — OFFICE VISIT (OUTPATIENT)
Dept: FAMILY MEDICINE CLINIC | Facility: CLINIC | Age: 40
End: 2020-02-20
Payer: COMMERCIAL

## 2020-02-20 VITALS
DIASTOLIC BLOOD PRESSURE: 90 MMHG | HEART RATE: 77 BPM | BODY MASS INDEX: 32 KG/M2 | SYSTOLIC BLOOD PRESSURE: 129 MMHG | TEMPERATURE: 97 F | RESPIRATION RATE: 16 BRPM | WEIGHT: 247 LBS

## 2020-02-20 DIAGNOSIS — J34.89 NASAL DRAINAGE: ICD-10-CM

## 2020-02-20 DIAGNOSIS — R10.32 LEFT GROIN PAIN: Primary | ICD-10-CM

## 2020-02-20 PROCEDURE — 99213 OFFICE O/P EST LOW 20 MIN: CPT | Performed by: FAMILY MEDICINE

## 2020-02-20 NOTE — PROGRESS NOTES
HPI: Karsten Albert is a 35year old male who presents for LLQ pain. Complains of intermittent left groin pain. Radiates into leg at times. Never noticed bulge. Has sensation under left ribcage after ejaculation. Happens every time, only after intercourse.  Norm

## 2020-03-04 ENCOUNTER — OFFICE VISIT (OUTPATIENT)
Dept: SURGERY | Facility: CLINIC | Age: 40
End: 2020-03-04
Payer: COMMERCIAL

## 2020-03-04 VITALS — HEIGHT: 74 IN | BODY MASS INDEX: 31.57 KG/M2 | WEIGHT: 246 LBS

## 2020-03-04 DIAGNOSIS — K40.90 LEFT INGUINAL HERNIA: Primary | ICD-10-CM

## 2020-03-04 PROCEDURE — 99213 OFFICE O/P EST LOW 20 MIN: CPT | Performed by: SURGERY

## 2020-03-04 NOTE — PATIENT INSTRUCTIONS
Obtain preoperative testing. Plan laparoscopic repair of left inguinal hernia possible right inguinal hernia with mesh    Same day surgery to call the day before with instructions.

## 2020-03-04 NOTE — H&P
History and Physical      HPI   Patient presents with:  Referral: Referral from Dr. Fang Winter for possible Left Inguinal Hernia. Onset 2- 3 mos.   BM's and urnie are normal.         HPI  Jose Zheng is a 35year old male who presents with a left inguinal her Problem Relation Age of Onset   • Arthritis Father    • Other (fibromylagia) Father    • Cancer Mother         Cancer - lung   • Diabetes Paternal Aunt    • Cancer Paternal Aunt         Cancer - lymphoma   • Diabetes Paternal Grandmother        Review of

## 2020-05-08 ENCOUNTER — NURSE TRIAGE (OUTPATIENT)
Dept: FAMILY MEDICINE CLINIC | Facility: CLINIC | Age: 40
End: 2020-05-08

## 2020-05-08 ENCOUNTER — TELEPHONE (OUTPATIENT)
Dept: SURGERY | Facility: CLINIC | Age: 40
End: 2020-05-08

## 2020-05-08 NOTE — TELEPHONE ENCOUNTER
Action Requested: Summary for Provider     []  Critical Lab, Recommendations Needed  [] Need Additional Advice  []   FYI    []   Need Orders  [] Need Medications Sent to Pharmacy  []  Other     SUMMARY: Per protocol advised : OV within 3 days , wants to be

## 2020-05-08 NOTE — TELEPHONE ENCOUNTER
Phone call to patient to reschedule surgery. Patient reports making an appointment with another provider. He declines a new appointment. I will inform Dr. Vy Warren.       JASWANT

## 2020-05-11 NOTE — PROGRESS NOTES
HPI: Lia Arredondo is a 35year old male who presents for multiple complaints. Pt states he has been very tired the past 2 weeks. He is sleeping a lot. He has had some shortness of breath and stabbing chest pain. No fevers. No cold symptoms. No v/d.   No sick cont Will check baseline bloodwork. Insomnia, unspecified type    Discussed sleep hygiene. Advised he try nightly Melatonin. Chest discomfort    Normal EKG. Likely secondary to anxiety. Rash     Improving. Advised BID Hydrocortisone.     Yenifer Mills

## 2020-05-14 DIAGNOSIS — R79.89 ABNORMAL LFTS: Primary | ICD-10-CM

## 2020-05-15 ENCOUNTER — PATIENT MESSAGE (OUTPATIENT)
Dept: FAMILY MEDICINE CLINIC | Facility: CLINIC | Age: 40
End: 2020-05-15

## 2020-05-15 NOTE — TELEPHONE ENCOUNTER
From: Margie Roberts  To: Domonique Larson DO  Sent: 5/15/2020 3:51 PM CDT  Subject: Test Results Question    Thank you! Should this in anyway affect hernia surgery next week?   Thanks

## 2020-05-15 NOTE — TELEPHONE ENCOUNTER
Please see patient email and advise   See lab result notes for labs done on 5/11/20 for reference to patient's question.

## 2020-05-22 NOTE — TELEPHONE ENCOUNTER
Dr. Berenice Gannon, this was your last note to pt regarding labs    Viewed by Jes Bledsoe on 5/22/2020  3:52 PM   Written by Yanick Osorio DO on 5/14/2020 12:48 PM   Liver function tests elevated.  I looked back and your CT scan in the past showed a fatty leilani

## 2020-05-24 RX ORDER — TRAMADOL HYDROCHLORIDE 50 MG/1
50 TABLET ORAL EVERY 6 HOURS PRN
Qty: 30 TABLET | Refills: 0 | Status: SHIPPED | OUTPATIENT
Start: 2020-05-24 | End: 2020-10-01

## 2020-06-04 ENCOUNTER — TELEPHONE (OUTPATIENT)
Dept: FAMILY MEDICINE CLINIC | Facility: CLINIC | Age: 40
End: 2020-06-04

## 2020-06-04 NOTE — TELEPHONE ENCOUNTER
Patient had OV 5/11, discussed sleep issues. Reports has been using Melatonin, was initially helping but no longer helping with sleep, requesting other recommendations.  Also reported gout flare up to left foot last few days, reports has been using Ibuprofe

## 2020-06-04 NOTE — TELEPHONE ENCOUNTER
----- Message from Domingo Kamara sent at 6/3/2020  7:23 PM CDT -----  Regarding: Non-Urgent Medical Question  Contact: [phone]  Hi Dr. Kendra Santacurz  I hope everyone is safe and well. So I tried some things to help get to  sleep. But ummmmm. ....latha karimi

## 2020-06-05 RX ORDER — ZOLPIDEM TARTRATE 5 MG/1
5 TABLET ORAL NIGHTLY PRN
Qty: 30 TABLET | Refills: 0 | Status: SHIPPED | OUTPATIENT
Start: 2020-06-05 | End: 2020-10-01

## 2020-06-05 NOTE — TELEPHONE ENCOUNTER
Ambien 5mg sent to pharmacy on file. Can take as needed. I sent the lower dose in so he wouldn't get too drowsy the next day. I took a  Look at the supplements and as far as I know, nothing would worsen his gout.

## 2020-09-23 ENCOUNTER — IMMUNIZATION (OUTPATIENT)
Dept: FAMILY MEDICINE CLINIC | Facility: CLINIC | Age: 40
End: 2020-09-23
Payer: COMMERCIAL

## 2020-09-23 DIAGNOSIS — Z23 NEED FOR VACCINATION: ICD-10-CM

## 2020-09-23 PROCEDURE — 90471 IMMUNIZATION ADMIN: CPT | Performed by: FAMILY MEDICINE

## 2020-09-23 PROCEDURE — 90686 IIV4 VACC NO PRSV 0.5 ML IM: CPT | Performed by: FAMILY MEDICINE

## 2020-10-01 ENCOUNTER — LAB ENCOUNTER (OUTPATIENT)
Dept: LAB | Age: 40
End: 2020-10-01
Attending: FAMILY MEDICINE
Payer: COMMERCIAL

## 2020-10-01 ENCOUNTER — OFFICE VISIT (OUTPATIENT)
Dept: FAMILY MEDICINE CLINIC | Facility: CLINIC | Age: 40
End: 2020-10-01
Payer: COMMERCIAL

## 2020-10-01 VITALS
HEIGHT: 74 IN | BODY MASS INDEX: 29.65 KG/M2 | HEART RATE: 76 BPM | TEMPERATURE: 97 F | SYSTOLIC BLOOD PRESSURE: 119 MMHG | WEIGHT: 231 LBS | RESPIRATION RATE: 16 BRPM | DIASTOLIC BLOOD PRESSURE: 79 MMHG

## 2020-10-01 DIAGNOSIS — Z00.00 ROUTINE PHYSICAL EXAMINATION: Primary | ICD-10-CM

## 2020-10-01 DIAGNOSIS — R79.89 ABNORMAL LFTS: ICD-10-CM

## 2020-10-01 DIAGNOSIS — R53.83 OTHER FATIGUE: ICD-10-CM

## 2020-10-01 DIAGNOSIS — F41.9 ANXIETY: ICD-10-CM

## 2020-10-01 PROCEDURE — 82306 VITAMIN D 25 HYDROXY: CPT

## 2020-10-01 PROCEDURE — 82607 VITAMIN B-12: CPT

## 2020-10-01 PROCEDURE — 36415 COLL VENOUS BLD VENIPUNCTURE: CPT

## 2020-10-01 PROCEDURE — 3078F DIAST BP <80 MM HG: CPT | Performed by: FAMILY MEDICINE

## 2020-10-01 PROCEDURE — 84403 ASSAY OF TOTAL TESTOSTERONE: CPT

## 2020-10-01 PROCEDURE — 86706 HEP B SURFACE ANTIBODY: CPT

## 2020-10-01 PROCEDURE — 99396 PREV VISIT EST AGE 40-64: CPT | Performed by: FAMILY MEDICINE

## 2020-10-01 PROCEDURE — 84402 ASSAY OF FREE TESTOSTERONE: CPT

## 2020-10-01 PROCEDURE — 87340 HEPATITIS B SURFACE AG IA: CPT

## 2020-10-01 PROCEDURE — 3008F BODY MASS INDEX DOCD: CPT | Performed by: FAMILY MEDICINE

## 2020-10-01 PROCEDURE — 86803 HEPATITIS C AB TEST: CPT

## 2020-10-01 PROCEDURE — 86708 HEPATITIS A ANTIBODY: CPT

## 2020-10-01 PROCEDURE — 86704 HEP B CORE ANTIBODY TOTAL: CPT

## 2020-10-01 PROCEDURE — 80076 HEPATIC FUNCTION PANEL: CPT

## 2020-10-01 PROCEDURE — 3074F SYST BP LT 130 MM HG: CPT | Performed by: FAMILY MEDICINE

## 2020-10-01 PROCEDURE — 80500 HEPATITIS A B + C PROFILE: CPT

## 2020-10-01 RX ORDER — MULTIVIT-MIN/IRON FUM/FOLIC AC 7.5 MG-4
1 TABLET ORAL DAILY
COMMUNITY

## 2020-10-01 NOTE — PROGRESS NOTES
HPI:  35 yr old male who presents for physical. Feeling good. Had hernia surgery in July in left groin. Back to work. Eats healthy. Drinks water. Lost some weight. Pt reports his anxiety is still present but better. He stopped taking Ambien.   Compla Good ROM. No LAD.   Thyroid normal.  CV:  Regular rate and rhythm; no murmurs  Lungs:  Clear to ausculation; good aeration               No wheezes, rales or rhonchi  Abd: soft, non-tender, non-distended          Normal bowel sounds; no masses          No

## 2020-11-24 ENCOUNTER — NURSE TRIAGE (OUTPATIENT)
Dept: FAMILY MEDICINE CLINIC | Facility: CLINIC | Age: 40
End: 2020-11-24

## 2020-11-24 NOTE — TELEPHONE ENCOUNTER
Action Requested: Summary for Provider     []  Critical Lab, Recommendations Needed  [] Need Additional Advice  [x]   FYI    []   Need Orders  [] Need Medications Sent to Pharmacy  []  Other     SUMMARY: Patient advised to go to ED now.  Reports yesterday w

## 2020-12-01 ENCOUNTER — OFFICE VISIT (OUTPATIENT)
Dept: FAMILY MEDICINE CLINIC | Facility: CLINIC | Age: 40
End: 2020-12-01
Payer: COMMERCIAL

## 2020-12-01 VITALS
SYSTOLIC BLOOD PRESSURE: 120 MMHG | BODY MASS INDEX: 30.06 KG/M2 | HEART RATE: 86 BPM | HEIGHT: 74 IN | OXYGEN SATURATION: 97 % | WEIGHT: 234.25 LBS | DIASTOLIC BLOOD PRESSURE: 87 MMHG | RESPIRATION RATE: 16 BRPM

## 2020-12-01 DIAGNOSIS — R53.83 FATIGUE, UNSPECIFIED TYPE: ICD-10-CM

## 2020-12-01 DIAGNOSIS — R42 DIZZINESS: Primary | ICD-10-CM

## 2020-12-01 PROCEDURE — 3074F SYST BP LT 130 MM HG: CPT | Performed by: FAMILY MEDICINE

## 2020-12-01 PROCEDURE — 99214 OFFICE O/P EST MOD 30 MIN: CPT | Performed by: FAMILY MEDICINE

## 2020-12-01 PROCEDURE — 3008F BODY MASS INDEX DOCD: CPT | Performed by: FAMILY MEDICINE

## 2020-12-01 PROCEDURE — 3079F DIAST BP 80-89 MM HG: CPT | Performed by: FAMILY MEDICINE

## 2020-12-01 PROCEDURE — 83036 HEMOGLOBIN GLYCOSYLATED A1C: CPT | Performed by: FAMILY MEDICINE

## 2020-12-01 PROCEDURE — 82962 GLUCOSE BLOOD TEST: CPT | Performed by: FAMILY MEDICINE

## 2020-12-01 PROCEDURE — 36416 COLLJ CAPILLARY BLOOD SPEC: CPT | Performed by: FAMILY MEDICINE

## 2020-12-02 NOTE — PROGRESS NOTES
HPI:    Balwinder Chand is a 38year old male presents to clinic for ER follow-up. Last week, patient had a dream that he was having a heart attack and woke up experiencing chest discomfort and numbness down her left arm.   Proceeded to the ER-work-up was n Refill   • Multiple Vitamins-Minerals (MULTI-VITAMIN/MINERALS) Oral Tab Take 1 tablet by mouth daily.          Allergies:    Amoxicillin             PAIN  Azithromycin            NAUSEA ONLY  Cat Dander [Dander]       Pollinex-T [Modifie*      Shellfish-Lawrence discussed. No barriers to learning observed.           Orders This Visit:  Orders Placed This Encounter      POC Finger stick glucose [60838]      POC Glycohemoglobin [18948]      Meds This Visit:  Requested Prescriptions      No prescriptions requested or

## 2021-05-03 ENCOUNTER — VIRTUAL PHONE E/M (OUTPATIENT)
Dept: FAMILY MEDICINE CLINIC | Facility: CLINIC | Age: 41
End: 2021-05-03
Payer: COMMERCIAL

## 2021-05-03 DIAGNOSIS — J01.90 ACUTE RHINOSINUSITIS: Primary | ICD-10-CM

## 2021-05-03 PROCEDURE — 99213 OFFICE O/P EST LOW 20 MIN: CPT | Performed by: FAMILY MEDICINE

## 2021-05-04 NOTE — PATIENT INSTRUCTIONS
The patient has been educated that she will see has a history for seasonal allergies, he might be experiencing congestion and drainage that comes with the season change.   This in addition to just received the COVID-19 vaccine on last Wednesday may have uni

## 2021-05-04 NOTE — PROGRESS NOTES
Virtual Telephone Check-In    Ken Jc verbally consents to a Virtual/Telephone Check-In visit on 05/03/21. Patient understands and accepts financial responsibility for any deductible, co-insurance and/or co-pays associated with this service. recommend Allegra-D 12 hours as needed for congestion and drainage. I encouraged the patient to keep us abreast of his progress or lack thereof.   Empiric antibiotic might be warranted if symptoms persist.          Please note that the following visit was

## 2021-07-27 ENCOUNTER — OFFICE VISIT (OUTPATIENT)
Dept: FAMILY MEDICINE CLINIC | Facility: CLINIC | Age: 41
End: 2021-07-27
Payer: COMMERCIAL

## 2021-07-27 VITALS
WEIGHT: 249.38 LBS | OXYGEN SATURATION: 96 % | SYSTOLIC BLOOD PRESSURE: 130 MMHG | BODY MASS INDEX: 32.01 KG/M2 | HEIGHT: 74 IN | HEART RATE: 84 BPM | DIASTOLIC BLOOD PRESSURE: 82 MMHG

## 2021-07-27 DIAGNOSIS — Z71.84 COUNSELING FOR TRAVEL: ICD-10-CM

## 2021-07-27 DIAGNOSIS — Z71.9 ENCOUNTER FOR CONSULTATION: Primary | ICD-10-CM

## 2021-07-27 PROCEDURE — 90471 IMMUNIZATION ADMIN: CPT | Performed by: FAMILY MEDICINE

## 2021-07-27 PROCEDURE — 3079F DIAST BP 80-89 MM HG: CPT | Performed by: FAMILY MEDICINE

## 2021-07-27 PROCEDURE — 90636 HEP A/HEP B VACC ADULT IM: CPT | Performed by: FAMILY MEDICINE

## 2021-07-27 PROCEDURE — 3075F SYST BP GE 130 - 139MM HG: CPT | Performed by: FAMILY MEDICINE

## 2021-07-27 PROCEDURE — 99204 OFFICE O/P NEW MOD 45 MIN: CPT | Performed by: FAMILY MEDICINE

## 2021-07-27 PROCEDURE — 3008F BODY MASS INDEX DOCD: CPT | Performed by: FAMILY MEDICINE

## 2021-07-27 RX ORDER — CIPROFLOXACIN 500 MG/1
TABLET, FILM COATED ORAL
Qty: 10 TABLET | Refills: 0 | Status: SHIPPED
Start: 2021-07-27

## 2021-07-27 NOTE — PROGRESS NOTES
HPI:   Patient presents with:  Consult: travel to [facility]. Sherita Rodjenifferfritzashtyn 68 is a 38year old male who presents [facility]:  Counseling, Advice and Immunizations    · Patient will be traveling to: [facility]   · He will be staying for on • COLONOSCOPY N/A 11/29/2017    Procedure: COLONOSCOPY;  Surgeon: David Umanzor MD;  Location: [address] [place] [address] ENDOSCOPY   • CORRECT BUNION,SIMPLE Left 2001   • [address]  2009   • KNEE SCOPE,MED OR LAT MENIS REPAIR Left 2002       Deonnai B  GI: NABS  NEURO: A&O x 3, motor and sensory grossly intact B UE and LE, nl gait  MS: no significant joint deformity, FROM B UE/LE  PSYCH:  mood and affect WNL, speech and thought congruent    ASSESSMENT AND PLAN:   1.  Patient is a 38year old male who p above    Orders Placed This Encounter      HEPATITIS A AND HEPATITIS B      Meds & Refills for this Visit:  Requested Prescriptions     Signed Prescriptions Disp Refills   • ciprofloxacin 500 MG Oral Tab 10 tablet 0     Sig: Take one tablet by mouth twice

## 2021-12-26 ENCOUNTER — APPOINTMENT (OUTPATIENT)
Dept: GENERAL RADIOLOGY | Age: 41
End: 2021-12-26
Attending: NURSE PRACTITIONER
Payer: COMMERCIAL

## 2021-12-26 ENCOUNTER — HOSPITAL ENCOUNTER (OUTPATIENT)
Age: 41
Discharge: HOME | End: 2021-12-26
Payer: COMMERCIAL

## 2021-12-26 VITALS
DIASTOLIC BLOOD PRESSURE: 77 MMHG | SYSTOLIC BLOOD PRESSURE: 134 MMHG | RESPIRATION RATE: 18 BRPM | HEART RATE: 84 BPM | TEMPERATURE: 98 F | OXYGEN SATURATION: 100 %

## 2021-12-26 DIAGNOSIS — R07.9 ACUTE CHEST PAIN: Primary | ICD-10-CM

## 2021-12-26 PROCEDURE — 93000 ELECTROCARDIOGRAM COMPLETE: CPT | Performed by: NURSE PRACTITIONER

## 2021-12-26 PROCEDURE — 36415 COLL VENOUS BLD VENIPUNCTURE: CPT | Performed by: NURSE PRACTITIONER

## 2021-12-26 PROCEDURE — 99214 OFFICE O/P EST MOD 30 MIN: CPT | Performed by: NURSE PRACTITIONER

## 2021-12-26 PROCEDURE — 71046 X-RAY EXAM CHEST 2 VIEWS: CPT | Performed by: NURSE PRACTITIONER

## 2021-12-26 PROCEDURE — 85025 COMPLETE CBC W/AUTO DIFF WBC: CPT | Performed by: NURSE PRACTITIONER

## 2021-12-26 PROCEDURE — U0002 COVID-19 LAB TEST NON-CDC: HCPCS | Performed by: NURSE PRACTITIONER

## 2021-12-26 PROCEDURE — 80047 BASIC METABLC PNL IONIZED CA: CPT | Performed by: NURSE PRACTITIONER

## 2021-12-26 PROCEDURE — 84484 ASSAY OF TROPONIN QUANT: CPT | Performed by: NURSE PRACTITIONER

## 2021-12-26 NOTE — ED INITIAL ASSESSMENT (HPI)
Pt here with complaints of left chest pain that radiates to his left arm ,pt states this has been going on for 1 month, pt states he has had a dizziness episode 2 weeks ago, pt denies any sob

## 2021-12-26 NOTE — ED PROVIDER NOTES
Patient presents with:  Chest Pain      HPI:     This 42year old male presents with a chief complaint of left-sided chest pain that occasionally radiates to the left arm that started about a month ago. The pain is reproducible with movement.   The pain is Not Asked        Special Diet: Not Asked        Back Care: Not Asked        Exercise: Not Asked        Bike Helmet: Not Asked        Seat Belt: Not Asked        Self-Exams: Not Asked    Social History Narrative      Not on file    Social Determinants of He Answer: Immediate      POCT CBC          Order Specific Question: Release to patient          Answer: Immediate      EKG 12 Lead          Order Specific Question: Release to patient          Answer: Immediate      POCT ISTAT chem8 cartridge      ISTAT Trop and interval noted. Rate is 75   Rhythm is NSR              No ST elevation. No previous to compare to.   MDM:  XR CHEST PA + LAT CHEST (CPT=71046)    Result Date: 12/26/2021  CONCLUSION:  Negative for radiographically evident acute intratho

## 2022-06-10 ENCOUNTER — NURSE TRIAGE (OUTPATIENT)
Dept: FAMILY MEDICINE CLINIC | Facility: CLINIC | Age: 42
End: 2022-06-10

## 2022-06-10 ENCOUNTER — HOSPITAL ENCOUNTER (OUTPATIENT)
Age: 42
Discharge: ANOTHER HOSPITAL | End: 2022-06-10
Payer: COMMERCIAL

## 2022-06-10 VITALS
SYSTOLIC BLOOD PRESSURE: 125 MMHG | TEMPERATURE: 98 F | DIASTOLIC BLOOD PRESSURE: 73 MMHG | OXYGEN SATURATION: 100 % | RESPIRATION RATE: 20 BRPM | HEART RATE: 69 BPM

## 2022-06-10 DIAGNOSIS — R31.29 OTHER MICROSCOPIC HEMATURIA: ICD-10-CM

## 2022-06-10 DIAGNOSIS — M10.9 ACUTE GOUT OF LEFT FOOT, UNSPECIFIED CAUSE: ICD-10-CM

## 2022-06-10 DIAGNOSIS — R10.9 FLANK PAIN: Primary | ICD-10-CM

## 2022-06-10 LAB
BILIRUB UR QL STRIP: NEGATIVE
CLARITY UR: CLEAR
COLOR UR: YELLOW
GLUCOSE UR STRIP-MCNC: NEGATIVE MG/DL
KETONES UR STRIP-MCNC: NEGATIVE MG/DL
LEUKOCYTE ESTERASE UR QL STRIP: NEGATIVE
NITRITE UR QL STRIP: NEGATIVE
PH UR STRIP: 6 [PH]
PROT UR STRIP-MCNC: NEGATIVE MG/DL
SP GR UR STRIP: 1.01
UROBILINOGEN UR STRIP-ACNC: <2 MG/DL

## 2022-06-10 PROCEDURE — 81002 URINALYSIS NONAUTO W/O SCOPE: CPT | Performed by: NURSE PRACTITIONER

## 2022-06-10 PROCEDURE — 99214 OFFICE O/P EST MOD 30 MIN: CPT | Performed by: NURSE PRACTITIONER

## 2022-06-10 RX ORDER — COLCHICINE 0.6 MG/1
0.6 TABLET ORAL DAILY
Qty: 3 TABLET | Refills: 0 | Status: SHIPPED | OUTPATIENT
Start: 2022-06-10

## 2022-06-10 NOTE — ED INITIAL ASSESSMENT (HPI)
Pt  began having left lower back pain on Monday. Pt states then began having some left big toe pain. Pt  has recently began changing his diet and thinks maybe attributed to it.

## 2022-06-13 ENCOUNTER — OFFICE VISIT (OUTPATIENT)
Dept: FAMILY MEDICINE CLINIC | Facility: CLINIC | Age: 42
End: 2022-06-13
Payer: COMMERCIAL

## 2022-06-13 ENCOUNTER — LAB ENCOUNTER (OUTPATIENT)
Dept: LAB | Age: 42
End: 2022-06-13
Attending: NURSE PRACTITIONER
Payer: COMMERCIAL

## 2022-06-13 VITALS
HEIGHT: 74 IN | HEART RATE: 70 BPM | DIASTOLIC BLOOD PRESSURE: 74 MMHG | SYSTOLIC BLOOD PRESSURE: 132 MMHG | BODY MASS INDEX: 31.06 KG/M2 | WEIGHT: 242 LBS

## 2022-06-13 DIAGNOSIS — M10.9 GOUTY ARTHRITIS OF LEFT GREAT TOE: ICD-10-CM

## 2022-06-13 DIAGNOSIS — M54.50 ACUTE LEFT-SIDED LOW BACK PAIN WITHOUT SCIATICA: Primary | ICD-10-CM

## 2022-06-13 LAB — RHEUMATOID FACT SERPL-ACNC: <10 IU/ML (ref ?–15)

## 2022-06-13 PROCEDURE — 86431 RHEUMATOID FACTOR QUANT: CPT

## 2022-06-13 PROCEDURE — 36415 COLL VENOUS BLD VENIPUNCTURE: CPT

## 2022-06-13 PROCEDURE — 86038 ANTINUCLEAR ANTIBODIES: CPT

## 2022-06-13 RX ORDER — METHYLPREDNISOLONE 4 MG/1
TABLET ORAL
Qty: 21 EACH | Refills: 0 | Status: SHIPPED | OUTPATIENT
Start: 2022-06-13

## 2022-06-16 LAB — NUCLEAR IGG TITR SER IF: NEGATIVE {TITER}

## 2022-07-13 ENCOUNTER — OFFICE VISIT (OUTPATIENT)
Dept: FAMILY MEDICINE CLINIC | Facility: CLINIC | Age: 42
End: 2022-07-13
Payer: COMMERCIAL

## 2022-07-13 VITALS
WEIGHT: 241 LBS | BODY MASS INDEX: 30.93 KG/M2 | SYSTOLIC BLOOD PRESSURE: 123 MMHG | HEIGHT: 74 IN | HEART RATE: 88 BPM | DIASTOLIC BLOOD PRESSURE: 72 MMHG

## 2022-07-13 DIAGNOSIS — Z00.00 WELL ADULT EXAM: Primary | ICD-10-CM

## 2022-07-13 DIAGNOSIS — Z12.11 SCREENING FOR MALIGNANT NEOPLASM OF COLON: ICD-10-CM

## 2022-07-13 DIAGNOSIS — Z12.5 SCREENING FOR MALIGNANT NEOPLASM OF PROSTATE: ICD-10-CM

## 2022-07-13 PROBLEM — D12.6 TUBULAR ADENOMA OF COLON: Status: ACTIVE | Noted: 2018-09-13

## 2022-07-13 PROCEDURE — 3074F SYST BP LT 130 MM HG: CPT | Performed by: NURSE PRACTITIONER

## 2022-07-13 PROCEDURE — 3078F DIAST BP <80 MM HG: CPT | Performed by: NURSE PRACTITIONER

## 2022-07-13 PROCEDURE — 3008F BODY MASS INDEX DOCD: CPT | Performed by: NURSE PRACTITIONER

## 2022-07-13 PROCEDURE — 99396 PREV VISIT EST AGE 40-64: CPT | Performed by: NURSE PRACTITIONER

## 2022-07-24 ENCOUNTER — LAB ENCOUNTER (OUTPATIENT)
Dept: LAB | Facility: HOSPITAL | Age: 42
End: 2022-07-24
Attending: NURSE PRACTITIONER
Payer: COMMERCIAL

## 2022-07-24 DIAGNOSIS — Z00.00 WELL ADULT EXAM: ICD-10-CM

## 2022-07-24 DIAGNOSIS — Z12.5 SCREENING FOR MALIGNANT NEOPLASM OF PROSTATE: ICD-10-CM

## 2022-07-24 LAB
ALBUMIN SERPL-MCNC: 3.6 G/DL (ref 3.4–5)
ALBUMIN/GLOB SERPL: 1 {RATIO} (ref 1–2)
ALP LIVER SERPL-CCNC: 74 U/L
ALT SERPL-CCNC: 56 U/L
ANION GAP SERPL CALC-SCNC: 10 MMOL/L (ref 0–18)
AST SERPL-CCNC: 29 U/L (ref 15–37)
BASOPHILS # BLD AUTO: 0.06 X10(3) UL (ref 0–0.2)
BASOPHILS NFR BLD AUTO: 0.9 %
BILIRUB SERPL-MCNC: 0.6 MG/DL (ref 0.1–2)
BUN BLD-MCNC: 16 MG/DL (ref 7–18)
BUN/CREAT SERPL: 16.2 (ref 10–20)
CALCIUM BLD-MCNC: 8.7 MG/DL (ref 8.5–10.1)
CHLORIDE SERPL-SCNC: 112 MMOL/L (ref 98–112)
CHOLEST SERPL-MCNC: 144 MG/DL (ref ?–200)
CO2 SERPL-SCNC: 22 MMOL/L (ref 21–32)
COMPLEXED PSA SERPL-MCNC: 0.89 NG/ML (ref ?–4)
CREAT BLD-MCNC: 0.99 MG/DL
DEPRECATED RDW RBC AUTO: 45.1 FL (ref 35.1–46.3)
EOSINOPHIL # BLD AUTO: 0.38 X10(3) UL (ref 0–0.7)
EOSINOPHIL NFR BLD AUTO: 5.7 %
ERYTHROCYTE [DISTWIDTH] IN BLOOD BY AUTOMATED COUNT: 13.4 % (ref 11–15)
FASTING PATIENT LIPID ANSWER: YES
FASTING STATUS PATIENT QL REPORTED: YES
GLOBULIN PLAS-MCNC: 3.6 G/DL (ref 2.8–4.4)
GLUCOSE BLD-MCNC: 87 MG/DL (ref 70–99)
HCT VFR BLD AUTO: 46.6 %
HDLC SERPL-MCNC: 46 MG/DL (ref 40–59)
HGB BLD-MCNC: 14.9 G/DL
IMM GRANULOCYTES # BLD AUTO: 0.04 X10(3) UL (ref 0–1)
IMM GRANULOCYTES NFR BLD: 0.6 %
LDLC SERPL CALC-MCNC: 81 MG/DL (ref ?–100)
LYMPHOCYTES # BLD AUTO: 1.89 X10(3) UL (ref 1–4)
LYMPHOCYTES NFR BLD AUTO: 28.2 %
MCH RBC QN AUTO: 29.1 PG (ref 26–34)
MCHC RBC AUTO-ENTMCNC: 32 G/DL (ref 31–37)
MCV RBC AUTO: 91 FL
MONOCYTES # BLD AUTO: 0.63 X10(3) UL (ref 0.1–1)
MONOCYTES NFR BLD AUTO: 9.4 %
NEUTROPHILS # BLD AUTO: 3.71 X10 (3) UL (ref 1.5–7.7)
NEUTROPHILS # BLD AUTO: 3.71 X10(3) UL (ref 1.5–7.7)
NEUTROPHILS NFR BLD AUTO: 55.2 %
NONHDLC SERPL-MCNC: 98 MG/DL (ref ?–130)
OSMOLALITY SERPL CALC.SUM OF ELEC: 299 MOSM/KG (ref 275–295)
PLATELET # BLD AUTO: 186 10(3)UL (ref 150–450)
POTASSIUM SERPL-SCNC: 4.2 MMOL/L (ref 3.5–5.1)
PROT SERPL-MCNC: 7.2 G/DL (ref 6.4–8.2)
RBC # BLD AUTO: 5.12 X10(6)UL
SODIUM SERPL-SCNC: 144 MMOL/L (ref 136–145)
TRIGL SERPL-MCNC: 89 MG/DL (ref 30–149)
TSI SER-ACNC: 0.9 MIU/ML (ref 0.36–3.74)
VLDLC SERPL CALC-MCNC: 14 MG/DL (ref 0–30)
WBC # BLD AUTO: 6.7 X10(3) UL (ref 4–11)

## 2022-07-24 PROCEDURE — 36415 COLL VENOUS BLD VENIPUNCTURE: CPT

## 2022-07-24 PROCEDURE — 80061 LIPID PANEL: CPT

## 2022-07-24 PROCEDURE — 84443 ASSAY THYROID STIM HORMONE: CPT

## 2022-07-24 PROCEDURE — 85025 COMPLETE CBC W/AUTO DIFF WBC: CPT

## 2022-07-24 PROCEDURE — 80053 COMPREHEN METABOLIC PANEL: CPT

## 2022-08-01 ENCOUNTER — E-VISIT (OUTPATIENT)
Dept: TELEHEALTH | Age: 42
End: 2022-08-01

## 2022-08-01 DIAGNOSIS — H10.31 ACUTE CONJUNCTIVITIS OF RIGHT EYE, UNSPECIFIED ACUTE CONJUNCTIVITIS TYPE: Primary | ICD-10-CM

## 2022-08-01 PROCEDURE — 99421 OL DIG E/M SVC 5-10 MIN: CPT | Performed by: PHYSICIAN ASSISTANT

## 2022-08-01 RX ORDER — TOBRAMYCIN 3 MG/ML
1 SOLUTION/ DROPS OPHTHALMIC EVERY 6 HOURS
Qty: 5 ML | Refills: 0 | Status: SHIPPED | OUTPATIENT
Start: 2022-08-01 | End: 2022-08-08

## 2022-09-02 ENCOUNTER — TELEPHONE (OUTPATIENT)
Dept: GASTROENTEROLOGY | Facility: CLINIC | Age: 42
End: 2022-09-02

## 2022-09-02 NOTE — TELEPHONE ENCOUNTER
----- Message from Felicita Byers RN sent at 2017  1:14 PM CST -----  Regardin year CLN recall  Entered into EPIC:Recall colon in 5 years per Dr. Babs Bear. Last Colon done 17, next due 2022. Snapshot updated.

## 2024-03-05 ENCOUNTER — OFFICE VISIT (OUTPATIENT)
Dept: FAMILY MEDICINE CLINIC | Facility: CLINIC | Age: 44
End: 2024-03-05
Payer: COMMERCIAL

## 2024-03-05 VITALS
SYSTOLIC BLOOD PRESSURE: 138 MMHG | HEIGHT: 74 IN | BODY MASS INDEX: 31.32 KG/M2 | HEART RATE: 72 BPM | DIASTOLIC BLOOD PRESSURE: 87 MMHG | WEIGHT: 244 LBS

## 2024-03-05 DIAGNOSIS — R10.32 BILATERAL GROIN PAIN: ICD-10-CM

## 2024-03-05 DIAGNOSIS — R10.31 BILATERAL GROIN PAIN: ICD-10-CM

## 2024-03-05 DIAGNOSIS — Z00.00 WELL ADULT EXAM: Primary | ICD-10-CM

## 2024-03-05 DIAGNOSIS — Z12.11 SCREENING FOR MALIGNANT NEOPLASM OF COLON: ICD-10-CM

## 2024-03-05 DIAGNOSIS — N50.89 MASS OF RIGHT TESTICLE: ICD-10-CM

## 2024-03-05 DIAGNOSIS — Z23 ENCOUNTER FOR ADMINISTRATION OF VACCINE: ICD-10-CM

## 2024-03-05 PROCEDURE — 3075F SYST BP GE 130 - 139MM HG: CPT | Performed by: NURSE PRACTITIONER

## 2024-03-05 PROCEDURE — 99214 OFFICE O/P EST MOD 30 MIN: CPT | Performed by: NURSE PRACTITIONER

## 2024-03-05 PROCEDURE — 90715 TDAP VACCINE 7 YRS/> IM: CPT | Performed by: NURSE PRACTITIONER

## 2024-03-05 PROCEDURE — 3008F BODY MASS INDEX DOCD: CPT | Performed by: NURSE PRACTITIONER

## 2024-03-05 PROCEDURE — 3079F DIAST BP 80-89 MM HG: CPT | Performed by: NURSE PRACTITIONER

## 2024-03-05 PROCEDURE — 99396 PREV VISIT EST AGE 40-64: CPT | Performed by: NURSE PRACTITIONER

## 2024-03-05 PROCEDURE — 90471 IMMUNIZATION ADMIN: CPT | Performed by: NURSE PRACTITIONER

## 2024-03-05 NOTE — PROGRESS NOTES
HPI    Patient presents for annual physical.  Negative for past medical history.  With concerns of a possible inguinal hernia on the right side.  Feels like pain radiates to right testicle and has a right testicular mass.  Has intermittent left sided groin pain, as well.  Did have a laparoscopic hernia repair about 4 years ago.  Unsure if it is because of he is doing a lot of lifting and exercising.  Does not wear any support.      Nocturia - negative.    Colonoscopy - overdue since 2022, referral order placed.    Diet - diet is healthy.    Exercise - working out regularly.    Immunizations - tdap today.      Review of Systems   Constitutional:  Negative for activity change, appetite change, fatigue and unexpected weight change.   HENT:  Negative for congestion, ear pain, rhinorrhea, sore throat and tinnitus.    Eyes:  Negative for pain, discharge and itching.   Respiratory:  Negative for cough, shortness of breath and wheezing.    Cardiovascular:  Negative for chest pain.   Gastrointestinal:  Negative for abdominal distention, abdominal pain, constipation, diarrhea, nausea and vomiting.   Genitourinary:         Bilateral groin pain, right testicular mass.    Musculoskeletal:  Negative for gait problem and joint swelling.   Hematological:  Negative for adenopathy. Does not bruise/bleed easily.   Psychiatric/Behavioral:  Negative for behavioral problems and confusion. The patient is not nervous/anxious.    All other systems reviewed and are negative.       Vitals:    03/05/24 1032   BP: 138/87   Pulse: 72   Weight: 244 lb (110.7 kg)   Height: 6' 2\" (1.88 m)     Body mass index is 31.33 kg/m².    Health Maintenance   Topic Date Due    Colorectal Cancer Screening  11/29/2022    Annual Physical  07/13/2023    COVID-19 Vaccine (3 - 2023-24 season) 09/01/2023    DTaP,Tdap,and Td Vaccines (2 - Td or Tdap) 01/18/2024    Influenza Vaccine (1) 06/30/2024 (Originally 10/1/2023)    Annual Depression Screening  Completed     Pneumococcal Vaccine: Birth to 64yrs  Aged Out       Past Medical History:   Diagnosis Date    Calculus of kidney     Constipation, chronic     Esophageal reflux     Extrinsic asthma, unspecified     Kidney stone     Lumbar disc herniation     L4-L5, tx by chiropractor    Sinusitis        .  Past Surgical History:   Procedure Laterality Date    Appendectomy  1981    laparotomy    Colonoscopy N/A 11/29/2017    Procedure: COLONOSCOPY;  Surgeon: IVAN Gilman MD;  Location: [facility] ENDOSCOPY    Correct bunion,simple Left 2001    Explor maxill sinus,rmv polyps  2009    Knee scope,med or lat menis repair Left 2002       Family History   Problem Relation Age of Onset    Arthritis Father     Other (fibromylagia) Father     Cancer Mother         Cancer - lung    Diabetes Paternal Aunt     Cancer Paternal Aunt         Cancer - lymphoma    Diabetes Paternal Grandmother        Social History     Socioeconomic History    Marital status:      Spouse name: Not on file    Number of children: 1    Years of education: Not on file    Highest education level: Not on file   Occupational History    Occupation:    Tobacco Use    Smoking status: Former    Smokeless tobacco: Never   Substance and Sexual Activity    Alcohol use: Yes     Alcohol/week: 0.0 standard drinks of alcohol     Comment: Socially    Drug use: No    Sexual activity: Not on file   Other Topics Concern     Service Not Asked    Blood Transfusions Not Asked    Caffeine Concern Yes     Comment: Coffee, 3 cups daily    Occupational Exposure Not Asked    Hobby Hazards Not Asked    Sleep Concern Not Asked    Stress Concern Not Asked    Weight Concern Not Asked    Special Diet Not Asked    Back Care Not Asked    Exercise Not Asked    Bike Helmet Not Asked    Seat Belt Not Asked    Self-Exams Not Asked   Social History Narrative    Not on file     Social Determinants of Health     Financial Resource Strain: Not on file   Food Insecurity: Not on  file   Transportation Needs: Not on file   Physical Activity: Not on file   Stress: Not on file   Social Connections: Not on file   Housing Stability: Not on file       Current Outpatient Medications   Medication Sig Dispense Refill    Multiple Vitamins-Minerals (MULTI-VITAMIN/MINERALS) Oral Tab Take 1 tablet by mouth daily.         Allergies:  Allergies   Allergen Reactions    Amoxicillin PAIN    Azithromycin NAUSEA ONLY    Cat Dander [Dander]     Fish-Derived Products UNKNOWN    Penicillamine UNKNOWN    Pollinex-T [Modified Tree Tyrosine Adsorbate]     Shellfish-Derived Products SHORTNESS OF BREATH       Physical Exam  Vitals and nursing note reviewed.   Constitutional:       General: He is not in acute distress.     Appearance: Normal appearance. He is well-developed. He is not ill-appearing, toxic-appearing or diaphoretic.   HENT:      Head: Normocephalic and atraumatic.      Right Ear: Hearing, tympanic membrane, ear canal and external ear normal. There is no impacted cerumen.      Left Ear: Hearing, tympanic membrane, ear canal and external ear normal. There is no impacted cerumen.      Nose: Nose normal. No congestion.      Mouth/Throat:      Mouth: Mucous membranes are moist.      Pharynx: Oropharynx is clear. No oropharyngeal exudate or posterior oropharyngeal erythema.   Eyes:      General:         Right eye: No discharge.         Left eye: No discharge.      Conjunctiva/sclera: Conjunctivae normal.   Neck:      Thyroid: No thyromegaly.   Cardiovascular:      Rate and Rhythm: Normal rate and regular rhythm.      Heart sounds: Normal heart sounds. No murmur heard.  Pulmonary:      Effort: Pulmonary effort is normal. No respiratory distress.      Breath sounds: Normal breath sounds. No stridor. No wheezing, rhonchi or rales.   Chest:      Chest wall: No tenderness.   Abdominal:      General: Abdomen is flat. Bowel sounds are normal. There is no distension.      Palpations: Abdomen is soft. There is no mass.       Tenderness: There is no abdominal tenderness. There is no guarding or rebound.      Hernia: No hernia is present.   Genitourinary:     Testes:         Right: Mass present.   Musculoskeletal:         General: Normal range of motion.      Cervical back: Normal range of motion and neck supple.   Lymphadenopathy:      Cervical: No cervical adenopathy.   Skin:     General: Skin is warm and dry.   Neurological:      Mental Status: He is alert and oriented to person, place, and time.   Psychiatric:         Mood and Affect: Mood normal.         Behavior: Behavior normal.         Thought Content: Thought content normal.         Judgment: Judgment normal.          Assessment and Plan:   Problem List Items Addressed This Visit    None  Visit Diagnoses       Well adult exam    -  Primary    Relevant Orders    TETANUS, DIPHTHERIA TOXOIDS AND ACELLULAR PERTUSIS VACCINE (TDAP), >7 YEARS, IM USE    Lipid Panel    Comp Metabolic Panel (14)    CBC With Differential With Platelet    TSH W Reflex To Free T4    GASTRO - INTERNAL    EKG 12 Lead    Screening for malignant neoplasm of colon        Relevant Orders    GASTRO - INTERNAL    Encounter for administration of vaccine        Relevant Orders    TETANUS, DIPHTHERIA TOXOIDS AND ACELLULAR PERTUSIS VACCINE (TDAP), >7 YEARS, IM USE    Bilateral groin pain        Relevant Orders    US GROIN BILATERAL (CPT=76882-50)    Mass of right testicle        Relevant Orders    US SCROTUM W/ DOPPLER (CPT=93975/95633)           Follow up for fasting labs.  Referral to gastro.  To follow up for ultrasounds of groin and scrotum.      Discussed plan of care with patient and patient is in agreement.  All questions answered. Patient to call with questions or concerns.    Encouraged to sign up for My Chart if not already registered.

## 2024-03-25 ENCOUNTER — LAB ENCOUNTER (OUTPATIENT)
Dept: LAB | Age: 44
End: 2024-03-25
Attending: NURSE PRACTITIONER
Payer: COMMERCIAL

## 2024-03-25 ENCOUNTER — HOSPITAL ENCOUNTER (OUTPATIENT)
Dept: ULTRASOUND IMAGING | Age: 44
Discharge: HOME | End: 2024-03-25
Attending: NURSE PRACTITIONER
Payer: COMMERCIAL

## 2024-03-25 DIAGNOSIS — Z00.00 WELL ADULT EXAM: ICD-10-CM

## 2024-03-25 DIAGNOSIS — R73.01 ELEVATED FASTING GLUCOSE: ICD-10-CM

## 2024-03-25 DIAGNOSIS — N50.89 MASS OF RIGHT TESTICLE: ICD-10-CM

## 2024-03-25 LAB
ALBUMIN SERPL-MCNC: 4.4 G/DL (ref 3.2–4.8)
ALBUMIN/GLOB SERPL: 1.6 {RATIO} (ref 1–2)
ALP LIVER SERPL-CCNC: 83 U/L
ALT SERPL-CCNC: 42 U/L
ANION GAP SERPL CALC-SCNC: 5 MMOL/L (ref 0–18)
AST SERPL-CCNC: 27 U/L (ref ?–34)
BASOPHILS # BLD AUTO: 0.05 X10(3) UL (ref 0–0.2)
BASOPHILS NFR BLD AUTO: 0.5 %
BILIRUB SERPL-MCNC: 0.9 MG/DL (ref 0.3–1.2)
BUN BLD-MCNC: 15 MG/DL (ref 9–23)
BUN/CREAT SERPL: 14.4 (ref 10–20)
CALCIUM BLD-MCNC: 9.1 MG/DL (ref 8.7–10.4)
CHLORIDE SERPL-SCNC: 109 MMOL/L (ref 98–112)
CHOLEST SERPL-MCNC: 135 MG/DL (ref ?–200)
CO2 SERPL-SCNC: 26 MMOL/L (ref 21–32)
CREAT BLD-MCNC: 1.04 MG/DL
DEPRECATED RDW RBC AUTO: 42.3 FL (ref 35.1–46.3)
EGFRCR SERPLBLD CKD-EPI 2021: 91 ML/MIN/1.73M2 (ref 60–?)
EOSINOPHIL # BLD AUTO: 0.36 X10(3) UL (ref 0–0.7)
EOSINOPHIL NFR BLD AUTO: 3.9 %
ERYTHROCYTE [DISTWIDTH] IN BLOOD BY AUTOMATED COUNT: 12.9 % (ref 11–15)
FASTING PATIENT LIPID ANSWER: YES
FASTING STATUS PATIENT QL REPORTED: YES
GLOBULIN PLAS-MCNC: 2.7 G/DL (ref 2.8–4.4)
GLUCOSE BLD-MCNC: 118 MG/DL (ref 70–99)
HCT VFR BLD AUTO: 46 %
HDLC SERPL-MCNC: 44 MG/DL (ref 40–59)
HGB BLD-MCNC: 15.5 G/DL
IMM GRANULOCYTES # BLD AUTO: 0.04 X10(3) UL (ref 0–1)
IMM GRANULOCYTES NFR BLD: 0.4 %
LDLC SERPL CALC-MCNC: 68 MG/DL (ref ?–100)
LYMPHOCYTES # BLD AUTO: 1.62 X10(3) UL (ref 1–4)
LYMPHOCYTES NFR BLD AUTO: 17.7 %
MCH RBC QN AUTO: 29.9 PG (ref 26–34)
MCHC RBC AUTO-ENTMCNC: 33.7 G/DL (ref 31–37)
MCV RBC AUTO: 88.8 FL
MONOCYTES # BLD AUTO: 0.71 X10(3) UL (ref 0.1–1)
MONOCYTES NFR BLD AUTO: 7.8 %
NEUTROPHILS # BLD AUTO: 6.37 X10 (3) UL (ref 1.5–7.7)
NEUTROPHILS # BLD AUTO: 6.37 X10(3) UL (ref 1.5–7.7)
NEUTROPHILS NFR BLD AUTO: 69.7 %
NONHDLC SERPL-MCNC: 91 MG/DL (ref ?–130)
OSMOLALITY SERPL CALC.SUM OF ELEC: 292 MOSM/KG (ref 275–295)
PLATELET # BLD AUTO: 166 10(3)UL (ref 150–450)
POTASSIUM SERPL-SCNC: 4.6 MMOL/L (ref 3.5–5.1)
PROT SERPL-MCNC: 7.1 G/DL (ref 5.7–8.2)
RBC # BLD AUTO: 5.18 X10(6)UL
SODIUM SERPL-SCNC: 140 MMOL/L (ref 136–145)
TRIGL SERPL-MCNC: 132 MG/DL (ref 30–149)
TSI SER-ACNC: 1.31 MIU/ML (ref 0.55–4.78)
VLDLC SERPL CALC-MCNC: 20 MG/DL (ref 0–30)
WBC # BLD AUTO: 9.2 X10(3) UL (ref 4–11)

## 2024-03-25 PROCEDURE — 85025 COMPLETE CBC W/AUTO DIFF WBC: CPT

## 2024-03-25 PROCEDURE — 36415 COLL VENOUS BLD VENIPUNCTURE: CPT

## 2024-03-25 PROCEDURE — 80053 COMPREHEN METABOLIC PANEL: CPT

## 2024-03-25 PROCEDURE — 93975 VASCULAR STUDY: CPT | Performed by: NURSE PRACTITIONER

## 2024-03-25 PROCEDURE — 83036 HEMOGLOBIN GLYCOSYLATED A1C: CPT

## 2024-03-25 PROCEDURE — 84443 ASSAY THYROID STIM HORMONE: CPT

## 2024-03-25 PROCEDURE — 80061 LIPID PANEL: CPT

## 2024-03-25 PROCEDURE — 76870 US EXAM SCROTUM: CPT | Performed by: NURSE PRACTITIONER

## 2024-03-27 DIAGNOSIS — R73.01 ELEVATED FASTING GLUCOSE: Primary | ICD-10-CM

## 2024-03-27 DIAGNOSIS — N50.89 MASS OF RIGHT TESTICLE: Primary | ICD-10-CM

## 2024-03-27 LAB
EST. AVERAGE GLUCOSE BLD GHB EST-MCNC: 120 MG/DL (ref 68–126)
HBA1C MFR BLD: 5.8 % (ref ?–5.7)

## 2024-07-09 ENCOUNTER — PATIENT MESSAGE (OUTPATIENT)
Dept: FAMILY MEDICINE CLINIC | Facility: CLINIC | Age: 44
End: 2024-07-09

## 2024-07-10 NOTE — TELEPHONE ENCOUNTER
Called no answer, left message for patient to return call regarding the physical formes that need to be filled out.

## 2024-07-10 NOTE — TELEPHONE ENCOUNTER
From: Godfrey Martin  To: Shabnam Mcnally  Sent: 7/9/2024 11:04 PM CDT  Subject: Physical form needed for [employer]. Is it possible to get a Physical Form filled out for [employer]?  Thanks

## 2024-07-12 NOTE — TELEPHONE ENCOUNTER
Called patient, left message informing patient form is complete and ready for . Form will be in the  for when patient comes by.

## 2024-12-23 ENCOUNTER — HOSPITAL ENCOUNTER (OUTPATIENT)
Age: 44
Discharge: HOME | End: 2024-12-23
Payer: COMMERCIAL

## 2024-12-23 ENCOUNTER — APPOINTMENT (OUTPATIENT)
Dept: GENERAL RADIOLOGY | Age: 44
End: 2024-12-23
Attending: PHYSICIAN ASSISTANT
Payer: COMMERCIAL

## 2024-12-23 VITALS
HEART RATE: 71 BPM | OXYGEN SATURATION: 98 % | DIASTOLIC BLOOD PRESSURE: 68 MMHG | SYSTOLIC BLOOD PRESSURE: 116 MMHG | TEMPERATURE: 98 F | RESPIRATION RATE: 18 BRPM

## 2024-12-23 DIAGNOSIS — J11.1 INFLUENZAL BRONCHITIS: Primary | ICD-10-CM

## 2024-12-23 DIAGNOSIS — R05.1 ACUTE COUGH: ICD-10-CM

## 2024-12-23 DIAGNOSIS — Z20.822 LAB TEST NEGATIVE FOR COVID-19 VIRUS: ICD-10-CM

## 2024-12-23 LAB
POCT INFLUENZA A: POSITIVE
POCT INFLUENZA B: NEGATIVE
SARS-COV-2 RNA RESP QL NAA+PROBE: NOT DETECTED

## 2024-12-23 PROCEDURE — 71046 X-RAY EXAM CHEST 2 VIEWS: CPT | Performed by: PHYSICIAN ASSISTANT

## 2024-12-23 RX ORDER — BENZONATATE 100 MG/1
100 CAPSULE ORAL 3 TIMES DAILY PRN
Qty: 30 CAPSULE | Refills: 0 | Status: SHIPPED | OUTPATIENT
Start: 2024-12-23 | End: 2025-01-22

## 2024-12-23 RX ORDER — PREDNISONE 20 MG/1
40 TABLET ORAL DAILY
Qty: 10 TABLET | Refills: 0 | Status: SHIPPED | OUTPATIENT
Start: 2024-12-23 | End: 2024-12-28

## 2024-12-23 RX ORDER — ALBUTEROL SULFATE 90 UG/1
2 INHALANT RESPIRATORY (INHALATION) EVERY 4 HOURS PRN
Qty: 1 EACH | Refills: 0 | Status: SHIPPED | OUTPATIENT
Start: 2024-12-23 | End: 2025-01-22

## 2024-12-23 NOTE — ED INITIAL ASSESSMENT (HPI)
Pt states Monday began having a cough, pt states next day began having fevers, fatigue, lack of appetite, diarrhea. Pt states symptoms seem to be improving now. Pt concerned for pneumonia.

## 2024-12-23 NOTE — ED PROVIDER NOTES
Patient Seen in: [facility]      History     Chief Complaint   Patient presents with    Cough/URI     Stated Complaint: Cough;Fever;Abdominal pain;Severe headache;Runny nose;Muscle pain    Subjective:   HPI    Patient is a 44-year-old male, presenting to immediate care for evaluation of intermittent nonproductive cough for approximately 1 week.  Initial fever, chills, body aches, headache, nasal congestion, runny nose, loose stools.  States predominately self resolved.  However having intermittent cough.  He is concerned for possible underlying walking pneumonia.  Endorses associated chest congestion and cough productive at times. No fevers.  No chest pain or shortness of breath.  No lethargy, weakness, confusion. Review of chart has prior history of nasal polyps, sinus surgery, sinusitis, extrinsic asthma.      Objective:     Past Medical History:    Calculus of kidney    Constipation, chronic    Esophageal reflux    Extrinsic asthma, unspecified    Kidney stone    Lumbar disc herniation    L4-L5, tx by chiropractor    Sinusitis              Past Surgical History:   Procedure Laterality Date    Appendectomy  1981    laparotomy    Colonoscopy N/A 11/29/2017    Procedure: COLONOSCOPY;  Surgeon: IVAN Gilman MD;  Location: [facility] ENDOSCOPY    Correct bunion,simple Left 2001    Explor maxill sinus,rmv polyps  2009    Knee scope,med or lat menis repair Left 2002                Social History     Socioeconomic History    Marital status:     Number of children: 1   Occupational History    Occupation:    Tobacco Use    Smoking status: Former    Smokeless tobacco: Never   Substance and Sexual Activity    Alcohol use: Yes     Alcohol/week: 0.0 standard drinks of alcohol     Comment: Socially    Drug use: No   Other Topics Concern    Caffeine Concern Yes     Comment: Coffee, 3 cups daily              Review of Systems   Constitutional:  Negative for chills and fever.   HENT:   Positive for congestion.    Eyes:  Negative for visual disturbance.   Respiratory:  Positive for cough.    Gastrointestinal:  Negative for abdominal pain, diarrhea and vomiting.   Musculoskeletal:  Negative for neck pain and neck stiffness.   Skin:  Negative for rash.   Allergic/Immunologic: Negative for immunocompromised state.   Neurological:  Negative for dizziness, weakness and light-headedness.   Psychiatric/Behavioral:  Negative for confusion.    All other systems reviewed and are negative.      Positive for stated complaint: Cough;Fever;Abdominal pain;Severe headache;Runny nose;Muscle pain  Other systems are as noted in HPI.  Constitutional and vital signs reviewed.      All other systems reviewed and negative except as noted above.    Physical Exam     ED Triage Vitals [12/23/24 0831]   /68   Pulse 71   Resp 18   Temp 97.8 °F (36.6 °C)   Temp src Oral   SpO2 98 %   O2 Device None (Room air)       Current Vitals:   Vital Signs  BP: 116/68  Pulse: 71  Resp: 18  Temp: 97.8 °F (36.6 °C)  Temp src: Oral    Oxygen Therapy  SpO2: 98 %  O2 Device: None (Room air)        Physical Exam  Vitals and nursing note reviewed.   Constitutional:       General: He is not in acute distress.     Appearance: Normal appearance. He is not ill-appearing, toxic-appearing or diaphoretic.   HENT:      Head: Normocephalic and atraumatic.      Nose: Congestion present.      Mouth/Throat:      Mouth: Mucous membranes are moist.      Comments: Postnasal drip.  Eyes:      Conjunctiva/sclera: Conjunctivae normal.   Cardiovascular:      Rate and Rhythm: Normal rate and regular rhythm.      Pulses: Normal pulses.   Pulmonary:      Effort: Pulmonary effort is normal. No respiratory distress.      Breath sounds: Wheezing present. No rhonchi.      Comments: Faint bibasal expiratory wheezing.  Musculoskeletal:         General: Normal range of motion.      Cervical back: Normal range of motion. No rigidity.   Neurological:      General: No  focal deficit present.      Mental Status: He is alert.      Motor: No weakness.      Gait: Gait normal.   Psychiatric:         Mood and Affect: Mood normal.         Behavior: Behavior normal.         ED Course     Labs Reviewed   POCT FLU TEST - Abnormal; Notable for the following components:       Result Value    POCT INFLUENZA A Positive (*)     All other components within normal limits    Narrative:     This assay is a rapid molecular in vitro test utilizing nucleic acid amplification of influenza A and B viral RNA.   RAPID SARS-COV-2 BY PCR - Normal     Results for orders placed or performed during the hospital encounter of 12/23/24   Rapid SARS-CoV-2 by PCR    Collection Time: 12/23/24  8:36 AM    Specimen: Nares; Other   Result Value Ref Range    Rapid SARS-CoV-2 by PCR Not Detected Not Detected   POCT Flu Test    Collection Time: 12/23/24  8:36 AM    Specimen: Nares; Other   Result Value Ref Range    POCT INFLUENZA A Positive (A) Negative    POCT INFLUENZA B Negative Negative     XR CHEST PA + LAT CHEST (CPT=71046)   Final Result   PROCEDURE: XR CHEST PA + LAT CHEST (CPT=71046)       COMPARISON: [facility] in [place], XR CHEST PA + LAT    CHEST (CPT=71046), 12/26/2021, 9:49 AM.       INDICATIONS: Cough x 1 week.       TECHNIQUE:   Two views.         FINDINGS:    CARDIAC/VASC: The cardiomediastinal silhouette is within normal limits of    size.   MEDIAST/GABBIE: No visible mass or adenopathy.    LUNGS/PLEURA: No focal opacity, pleural effusion, or pneumothorax.  There    is no evidence of pulmonary edema.   BONES: No fracture or visible bony lesion.    OTHER: Negative.                     =====   CONCLUSION:        No focal opacity, pleural effusion, or pneumothorax.             Dictated by (CST): Jack Hartman MD on 12/23/2024 at 9:09 AM        Finalized by (CST): Jack Hartman MD on 12/23/2024 at 9:09 AM                        MDM     Differential diagnoses considered included, but are  not exclusive of: URI, influenza, COVID, otitis, sinusitis, pharyngitis, strep throat, bronchitis, pneumonia, etc.      Patient is a 44-year-old male, presenting to immediate care for evaluation of intermittent cough and congestion for approximately 1 week.  Multiple sick contacts at home.  She is concerned for underlying walking pneumonia.  Patient is well-appearing.  Afebrile.  Nontachycardic not hypoxic.  Exam no for nasal congestion postnasal drip.  Lung exam notable for faint bibasilar expiratory wheezing without conversational dyspnea or retractions or increased work of breathing.  Occasional nonproductive cough.  Remainder examination is unremarkable.  COVID PCR test is negative.  Influenza A PCR is positive.  Chest x-ray for evaluation of pneumonia is negative.  Exam and history consistent with influenza all bronchitis.  Will treat outpatient supportively.  Patient out of Tamiflu benefit window.  Will prescribe Tessalon for cough.  Instructed to take with Mucinex cough medicine.  Short course prednisone and albuterol bronchitis/wheezing.  PCP follow-up.  ED return precautions    Medical Decision Making      Disposition and Plan     Clinical Impression:  1. Influenzal bronchitis    2. Acute cough    3. Lab test negative for COVID-19 virus         Disposition:  Discharge  12/23/2024  9:20 am    Follow-up:  Weiler, Colleen M, DO  [address] [address]  [phone]                Medications Prescribed:  Discharge Medication List as of 12/23/2024  9:20 AM        START taking these medications    Details   albuterol 108 (90 Base) MCG/ACT Inhalation Aero Soln Inhale 2 puffs into the lungs every 4 (four) hours as needed for Wheezing., Normal, Disp-1 each, R-0      predniSONE 20 MG Oral Tab Take 2 tablets (40 mg total) by mouth daily for 5 days., Normal, Disp-10 tablet, R-0      benzonatate 100 MG Oral Cap Take 1 capsule (100 mg total) by mouth 3 (three) times daily as needed for cough.,  Normal, Disp-30 capsule, R-0                 Supplementary Documentation:

## 2025-07-21 ENCOUNTER — OFFICE VISIT (OUTPATIENT)
Dept: FAMILY MEDICINE CLINIC | Facility: CLINIC | Age: 45
End: 2025-07-21

## 2025-07-21 VITALS
RESPIRATION RATE: 16 BRPM | HEIGHT: 74 IN | WEIGHT: 231 LBS | HEART RATE: 63 BPM | OXYGEN SATURATION: 98 % | TEMPERATURE: 98 F | SYSTOLIC BLOOD PRESSURE: 124 MMHG | BODY MASS INDEX: 29.65 KG/M2 | DIASTOLIC BLOOD PRESSURE: 82 MMHG

## 2025-07-21 DIAGNOSIS — Z02.89 ENCOUNTER FOR CAMP ADMISSION HISTORY AND PHYSICAL: Primary | ICD-10-CM

## 2025-07-21 NOTE — PROGRESS NOTES
CHIEF COMPLAINT:     Chief Complaint   Patient presents with    Physical     Physical to accompany his son to [facility]       HPI:   Godfrey Martin is a 44 year old male who presents for a physical exam to attend [facility].    Patient has does not have medical concerns.     To the best of your knowledge are you up to date on vaccinations Yes     Do you anticipate working with hazardous chemicals or materials, infectious agents, or laboratory animals? No     Is there a chance that you will be exposed to human blood or body fluids as a result of routine job duties? No     If your job involves working at a computer, have you had or are you experiencing any discomfort, pain or numbness when working at your desk? No     Does your job require any heavy lifting? No     Do you feel for any reason that you will not be able to carry out the duties of this job? No     Have you ever had an occupational injury/illness before? No     Do you have any condition (physical, medical, or psychological) that would require special accommodations in order for you to perform you job? No      Current Medications[1]   Past Medical History[2]   Past Surgical History[3]   Family History[4]   Social History:  Short Social Hx on File[5]   Occ: Camp volunteer.   : yes.   Children: yes.      REVIEW OF SYSTEMS:   GENERAL: Feels well. No night sweats. No Fevers.   SKIN: denies any unusual skin lesions  EYES:denies blurred vision or double vision  HEENT: denies nasal congestion, sinus pain or sore throat  LUNGS: denies shortness of breath at rest on exertion  CARDIOVASCULAR: denies chest pain or palpitations   GI: denies abdominal pain or heartburn.  No N/V/C/D.  MUSCULOSKELETAL: denies back pain or other joint pain  NEURO: denies headaches  PSYCHE: denies depression or anxiety  HEMATOLOGIC: denies hx of anemia or other bleeding disorders  ENDOCRINE: denies thyroid history  ALLERGY/ASTHMA:  hx of seasonal allergies or  asthma    EXAM:   /82   Pulse 63   Temp 97.8 °F (36.6 °C) (Tympanic)   Resp 16   Ht 6' 2\" (1.88 m)   Wt 231 lb (104.8 kg)   SpO2 98%   BMI 29.66 kg/m²  Body mass index is 29.66 kg/m².     GENERAL: well developed, well nourished,in no apparent distress  SKIN: no rashes,no suspicious lesions  HEENT: atraumatic, normocephalic,ears and throat are clear  EYES:PERRLA, EOMI,conjunctiva are clear  NECK: supple,no adenopathy,no bruits  CHEST: no chest tenderness  LUNGS: Clear to auscultation bilaterally. No diminished breath sounds. No wheezing, rhonchi, or rales.  CARDIO: RRR without murmur  GI: BS's present x4., No tenderness of palpation.  No obvious masses or palpable organomegaly   MUSCULOSKELETAL: back is not tender, ROM of the back fully intact, Strength 5/5 bilaterally  EXTREMITIES: no cyanosis, clubbing or edema  NEURO: Alert & Oriented x3. Cranial nerves are grossly intact. DTR 2+ bilaterally  PSYCH: Mood and affect are congruent and normal.      ASSESSMENT AND PLAN:   Godfrey Martin is a 44 year old male who presents for an  a employment physical exam.      ASSESSMENT:    PLAN: Patient was found free of any mental or physical condition which would prevent him from performing his work. 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 Patient was encouraged to watch exercise and dietary habits.     Form filled out and given to patient.  Form was copied and sent to scanning.     Patient's questions/concerns were addressed and answered. Patient is in agreement with treatment plan.     Patient is to follow up as needed with PCP.                  [1]   Current Outpatient Medications   Medication Sig Dispense Refill    Multiple Vitamins-Minerals (MULTI-VITAMIN/MINERALS) Oral Tab Take 1 tablet by mouth daily.     [2]   Past Medical History:   Calculus of kidney    Constipation, chronic    Esophageal reflux    Extrinsic asthma, unspecified    Kidney stone    Lumbar disc herniation    L4-L5, tx by chiropractor    Sinusitis    [3]   Past Surgical History:  Procedure Laterality Date    Appendectomy  1981    laparotomy    Colonoscopy N/A 11/29/2017    Procedure: COLONOSCOPY;  Surgeon: IVAN Gilman MD;  Location: [facility] ENDOSCOPY    Correct bunion,simple Left 2001    Explor maxill sinus,rmv polyps  2009    Knee scope,med or lat menis repair Left 2002   [4]   Family History  Problem Relation Age of Onset    Arthritis Father     Other (fibromylagia) Father     Cancer Mother         Cancer - lung    Diabetes Paternal Aunt     Cancer Paternal Aunt         Cancer - lymphoma    Diabetes Paternal Grandmother    [5]   Social History  Socioeconomic History    Marital status:     Number of children: 1   Occupational History    Occupation:    Tobacco Use    Smoking status: Former    Smokeless tobacco: Never   Substance and Sexual Activity    Alcohol use: Yes     Alcohol/week: 0.0 standard drinks of alcohol     Comment: Socially    Drug use: No   Other Topics Concern    Caffeine Concern Yes     Comment: Coffee, 3 cups daily

## (undated) DEVICE — SNARE 9MM 230CM 2.4MM EXACTO

## (undated) DEVICE — FORCEP RADIAL JAW 4

## (undated) DEVICE — Device: Brand: DEFENDO AIR/WATER/SUCTION AND BIOPSY VALVE

## (undated) DEVICE — SNARE OPTMZ PLPCTM TRP

## (undated) DEVICE — ENDOSCOPY PACK - LOWER: Brand: MEDLINE INDUSTRIES, INC.

## (undated) NOTE — LETTER
9/2/2022    [address] [facility] [address]            Dear [facility],      Our records indicate that you are due for an appointment for a Colonoscopy with Qamar Field MD. Our doctors are booking out about 3-5 months for procedures. Please call our office to schedule this appointment. Your medical well-being is important to us. If your insurance requires a referral, please call your primary care office to request one.       Thank you,      The Physicians and Staff at [facility]

## (undated) NOTE — LETTER
7/10/2017              [facility] [address]         Dear Donaldo Patel,      It was a pleasure to see you at our [address] office.   Your urine test that was done recently was

## (undated) NOTE — LETTER
Pretty Sanchez, [address] [facility] [address]  [facility] [address], [place]       09/18/17        Patient: Iesha Valle   YOB: 1980   Date of Visit: 9/18/2017       Dear  Dr. Kory Turcios,      Thank you for referring Iesha aVlle to my practice.   Please fin

## (undated) NOTE — MR AVS SNAPSHOT
[facility]  [address], [address]  [phone]               Thank you for choosing us for your health care visit with Helga Nash MD.  We are glad to serve you and happy to provide you with this summary authorization numbers or be assured that none are required. You can then schedule your appointment. Failure to obtain required authorization numbers can create reimbursement difficulties for you.     Assoc Dx:  Dysuria [R30.0]        Canceled Orders    UROL Take 3 mL (1.25 mg total) by nebulization every 6 (six) hours as needed for Wheezing.    Commonly known as:  ACCUNEB           COMBIVENT RESPIMAT  MCG/ACT Aers   Generic drug:  Ipratropium-Albuterol           Fexofenadine-Pseudoephed ER  MG Tb12

## (undated) NOTE — LETTER
04/03/20        Anabella Duran  [address]      Dear Janette Cruz,    [address] records indicate that you have outstanding lab work and or testing that was ordered for you and has not yet been completed:  Orders Placed This Encounter      [place]

## (undated) NOTE — MR AVS SNAPSHOT
[place]  [address]  [phone]               Thank you for choosing us for your health care visit with Whitney Tena MD.  We are glad to serve you and happy to provide you with this summary insurance company's guidelines for prior authorization for this test.  You may be held responsible for payment in full if proper authorization is not acquired. Please contact the Patient Business Office at [phone] if you have any questions related to i